# Patient Record
Sex: FEMALE | Race: WHITE | NOT HISPANIC OR LATINO
[De-identification: names, ages, dates, MRNs, and addresses within clinical notes are randomized per-mention and may not be internally consistent; named-entity substitution may affect disease eponyms.]

---

## 2018-12-13 ENCOUNTER — RECORD ABSTRACTING (OUTPATIENT)
Age: 63
End: 2018-12-13

## 2018-12-13 DIAGNOSIS — Z87.19 PERSONAL HISTORY OF OTHER DISEASES OF THE DIGESTIVE SYSTEM: ICD-10-CM

## 2018-12-13 DIAGNOSIS — Z82.61 FAMILY HISTORY OF ARTHRITIS: ICD-10-CM

## 2018-12-13 LAB — CYTOLOGY CVX/VAG DOC THIN PREP: NORMAL

## 2018-12-26 ENCOUNTER — APPOINTMENT (OUTPATIENT)
Age: 63
End: 2018-12-26

## 2018-12-26 ENCOUNTER — APPOINTMENT (OUTPATIENT)
Dept: FAMILY MEDICINE | Facility: CLINIC | Age: 63
End: 2018-12-26
Payer: MEDICAID

## 2018-12-26 PROCEDURE — 86580 TB INTRADERMAL TEST: CPT

## 2018-12-27 ENCOUNTER — RX RENEWAL (OUTPATIENT)
Age: 63
End: 2018-12-27

## 2018-12-28 ENCOUNTER — APPOINTMENT (OUTPATIENT)
Dept: FAMILY MEDICINE | Facility: CLINIC | Age: 63
End: 2018-12-28
Payer: MEDICAID

## 2018-12-28 PROCEDURE — G0009: CPT

## 2018-12-28 PROCEDURE — 86580 TB INTRADERMAL TEST: CPT

## 2018-12-28 PROCEDURE — 90732 PPSV23 VACC 2 YRS+ SUBQ/IM: CPT

## 2019-01-17 ENCOUNTER — APPOINTMENT (OUTPATIENT)
Dept: RHEUMATOLOGY | Facility: CLINIC | Age: 64
End: 2019-01-17
Payer: MEDICAID

## 2019-01-17 VITALS
RESPIRATION RATE: 16 BRPM | HEIGHT: 64.5 IN | DIASTOLIC BLOOD PRESSURE: 82 MMHG | SYSTOLIC BLOOD PRESSURE: 128 MMHG | HEART RATE: 93 BPM | OXYGEN SATURATION: 98 % | BODY MASS INDEX: 19.23 KG/M2 | WEIGHT: 114 LBS

## 2019-01-17 PROCEDURE — 99215 OFFICE O/P EST HI 40 MIN: CPT

## 2019-01-17 NOTE — HISTORY OF PRESENT ILLNESS
[FreeTextEntry1] : Continued on current regimen (i.e. Plaq and prednisone 10 mg daily) and still having some mild cervical spine symptoms.  Had negative PPD; and had a mild respiratory infection since last visit, but this has almost completely resolved at this time (mild residual chronic sinus symptoms).  Here for Enbrel teaching.

## 2019-01-17 NOTE — PHYSICAL EXAM
[General Appearance - Alert] : alert [General Appearance - In No Acute Distress] : in no acute distress [General Appearance - Well Nourished] : well nourished [General Appearance - Well Developed] : well developed [Sclera] : the sclera and conjunctiva were normal [Auscultation Breath Sounds / Voice Sounds] : lungs were clear to auscultation bilaterally [Cervical Lymph Nodes Enlarged Posterior Bilaterally] : posterior cervical [Cervical Lymph Nodes Enlarged Anterior Bilaterally] : anterior cervical [Musculoskeletal - Swelling] : no joint swelling seen [] : no rash [Motor Exam] : the motor exam was normal [FreeTextEntry1] : No peripheral synovitis; there is decreased ROM of C spine.

## 2019-01-23 ENCOUNTER — APPOINTMENT (OUTPATIENT)
Dept: INTERNAL MEDICINE | Facility: CLINIC | Age: 64
End: 2019-01-23
Payer: MEDICAID

## 2019-01-23 ENCOUNTER — TRANSCRIPTION ENCOUNTER (OUTPATIENT)
Age: 64
End: 2019-01-23

## 2019-01-23 VITALS — SYSTOLIC BLOOD PRESSURE: 140 MMHG | DIASTOLIC BLOOD PRESSURE: 88 MMHG

## 2019-01-23 VITALS
DIASTOLIC BLOOD PRESSURE: 90 MMHG | BODY MASS INDEX: 18.38 KG/M2 | HEART RATE: 92 BPM | OXYGEN SATURATION: 100 % | SYSTOLIC BLOOD PRESSURE: 160 MMHG | HEIGHT: 64.5 IN | TEMPERATURE: 98.1 F | WEIGHT: 109 LBS

## 2019-01-23 PROCEDURE — 36415 COLL VENOUS BLD VENIPUNCTURE: CPT

## 2019-01-23 PROCEDURE — 99213 OFFICE O/P EST LOW 20 MIN: CPT | Mod: 25

## 2019-01-23 RX ORDER — BENZONATATE 100 MG/1
100 CAPSULE ORAL 3 TIMES DAILY
Refills: 0 | Status: DISCONTINUED | COMMUNITY
End: 2019-01-23

## 2019-01-23 RX ORDER — ALBUTEROL SULFATE 90 UG/1
108 (90 BASE) AEROSOL, METERED RESPIRATORY (INHALATION) EVERY 6 HOURS
Refills: 0 | Status: DISCONTINUED | COMMUNITY
End: 2019-01-23

## 2019-01-23 NOTE — HISTORY OF PRESENT ILLNESS
[FreeTextEntry8] : c/o feeling tired and no appetite since last week, had first Enbrel shot last Thursday and by Fri started to feel tired and congested, also c  nausea and  unable to eat or drink ,no diarrhea

## 2019-01-23 NOTE — PHYSICAL EXAM
[No Acute Distress] : no acute distress [Normal Sclera/Conjunctiva] : normal sclera/conjunctiva [Normal Oropharynx] : the oropharynx was normal [Normal TMs] : both tympanic membranes were normal [Normal Nasal Mucosa] : the nasal mucosa was normal [Supple] : supple [No Lymphadenopathy] : no lymphadenopathy [No Respiratory Distress] : no respiratory distress  [Clear to Auscultation] : lungs were clear to auscultation bilaterally [Normal Rate] : normal rate  [Regular Rhythm] : with a regular rhythm [Normal S1, S2] : normal S1 and S2 [Soft] : abdomen soft [Non Tender] : non-tender [Normal Bowel Sounds] : normal bowel sounds [Normal Gait] : normal gait [No Focal Deficits] : no focal deficits [de-identified] : looking tired

## 2019-01-23 NOTE — REVIEW OF SYSTEMS
[Fatigue] : fatigue [Recent Change In Weight] : ~T recent weight change [Postnasal Drip] : postnasal drip [Nausea] : nausea [Heartburn] : heartburn [Joint Pain] : joint pain [Joint Stiffness] : joint stiffness [Negative] : Neurological [Fever] : no fever [Chills] : no chills [Earache] : no earache [Nasal Discharge] : no nasal discharge [Sore Throat] : no sore throat [Abdominal Pain] : no abdominal pain [Diarrhea] : diarrhea [Vomiting] : no vomiting [FreeTextEntry2] : lost 2-3 lb [FreeTextEntry9] : neck , better since treated

## 2019-01-24 LAB
ALBUMIN SERPL ELPH-MCNC: 4.7 G/DL
ALP BLD-CCNC: 41 U/L
ALT SERPL-CCNC: 15 U/L
ANION GAP SERPL CALC-SCNC: 14 MMOL/L
AST SERPL-CCNC: 17 U/L
BASOPHILS # BLD AUTO: 0.02 K/UL
BASOPHILS NFR BLD AUTO: 0.2 %
BILIRUB SERPL-MCNC: 0.4 MG/DL
BUN SERPL-MCNC: 9 MG/DL
CALCIUM SERPL-MCNC: 10 MG/DL
CHLORIDE SERPL-SCNC: 102 MMOL/L
CO2 SERPL-SCNC: 27 MMOL/L
CREAT SERPL-MCNC: 0.93 MG/DL
EOSINOPHIL # BLD AUTO: 0.01 K/UL
EOSINOPHIL NFR BLD AUTO: 0.1 %
GLUCOSE SERPL-MCNC: 111 MG/DL
HCT VFR BLD CALC: 49.4 %
HGB BLD-MCNC: 15.2 G/DL
IMM GRANULOCYTES NFR BLD AUTO: 0.1 %
LYMPHOCYTES # BLD AUTO: 0.82 K/UL
LYMPHOCYTES NFR BLD AUTO: 9.8 %
MAN DIFF?: NORMAL
MCHC RBC-ENTMCNC: 28.5 PG
MCHC RBC-ENTMCNC: 30.8 GM/DL
MCV RBC AUTO: 92.5 FL
MONOCYTES # BLD AUTO: 0.13 K/UL
MONOCYTES NFR BLD AUTO: 1.5 %
NEUTROPHILS # BLD AUTO: 7.4 K/UL
NEUTROPHILS NFR BLD AUTO: 88.3 %
PLATELET # BLD AUTO: 323 K/UL
POTASSIUM SERPL-SCNC: 4.6 MMOL/L
PROT SERPL-MCNC: 6.8 G/DL
RBC # BLD: 5.34 M/UL
RBC # FLD: 13.6 %
SODIUM SERPL-SCNC: 143 MMOL/L
WBC # FLD AUTO: 8.39 K/UL

## 2019-02-06 ENCOUNTER — RX RENEWAL (OUTPATIENT)
Age: 64
End: 2019-02-06

## 2019-03-01 ENCOUNTER — OTHER (OUTPATIENT)
Age: 64
End: 2019-03-01

## 2019-03-13 ENCOUNTER — APPOINTMENT (OUTPATIENT)
Dept: RHEUMATOLOGY | Facility: CLINIC | Age: 64
End: 2019-03-13
Payer: MEDICAID

## 2019-03-13 VITALS
BODY MASS INDEX: 19.39 KG/M2 | DIASTOLIC BLOOD PRESSURE: 90 MMHG | WEIGHT: 115 LBS | HEIGHT: 64.5 IN | SYSTOLIC BLOOD PRESSURE: 156 MMHG

## 2019-03-13 PROCEDURE — 99213 OFFICE O/P EST LOW 20 MIN: CPT

## 2019-03-13 RX ORDER — ETANERCEPT 50 MG/ML
50 SOLUTION SUBCUTANEOUS
Qty: 4 | Refills: 2 | Status: DISCONTINUED | COMMUNITY
Start: 1900-01-01 | End: 2019-03-13

## 2019-03-13 RX ORDER — PANTOPRAZOLE 40 MG/1
40 TABLET, DELAYED RELEASE ORAL
Qty: 30 | Refills: 0 | Status: DISCONTINUED | COMMUNITY
Start: 2019-01-23 | End: 2019-03-13

## 2019-03-13 NOTE — HISTORY OF PRESENT ILLNESS
[FreeTextEntry1] : Patient reports that despite having increased the prednisone back to 5 mg her neck symptoms have worsened, with severe neck pain and stiffness, and inability to turn her head. 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

## 2019-03-13 NOTE — PHYSICAL EXAM
[General Appearance - Alert] : alert [General Appearance - In No Acute Distress] : in no acute distress [General Appearance - Well Nourished] : well nourished [General Appearance - Well Developed] : well developed [Sclera] : the sclera and conjunctiva were normal [Cervical Lymph Nodes Enlarged Posterior Bilaterally] : posterior cervical [Musculoskeletal - Swelling] : no joint swelling seen [] : no rash [Motor Exam] : the motor exam was normal [FreeTextEntry1] : No peripheral synovitis; there is severely decreased ROM of C spine.

## 2019-03-13 NOTE — REVIEW OF SYSTEMS
[Feeling Tired] : feeling tired [Joint Pain] : joint pain [Joint Stiffness] : joint stiffness [Limb Pain] : limb pain [Fever] : no fever [Chills] : no chills [Feeling Poorly] : not feeling poorly [Eye Pain] : no eye pain [Red Eyes] : eyes not red [Shortness Of Breath] : no shortness of breath [Cough] : no cough [Skin Lesions] : no skin lesions [FreeTextEntry6] : No pleuritic C/P

## 2019-03-13 NOTE — REASON FOR VISIT
[Acute] : an acute visit [FreeTextEntry1] : Recurrence of neck pain and stiffness while tapering prednisone

## 2019-03-18 ENCOUNTER — RX RENEWAL (OUTPATIENT)
Age: 64
End: 2019-03-18

## 2019-03-20 ENCOUNTER — RX RENEWAL (OUTPATIENT)
Age: 64
End: 2019-03-20

## 2019-03-28 ENCOUNTER — LABORATORY RESULT (OUTPATIENT)
Age: 64
End: 2019-03-28

## 2019-03-28 ENCOUNTER — APPOINTMENT (OUTPATIENT)
Dept: INTERNAL MEDICINE | Facility: CLINIC | Age: 64
End: 2019-03-28
Payer: MEDICAID

## 2019-03-28 VITALS
HEART RATE: 64 BPM | WEIGHT: 112 LBS | BODY MASS INDEX: 18.89 KG/M2 | SYSTOLIC BLOOD PRESSURE: 118 MMHG | TEMPERATURE: 98.3 F | DIASTOLIC BLOOD PRESSURE: 72 MMHG | HEIGHT: 64.5 IN

## 2019-03-28 PROCEDURE — 36415 COLL VENOUS BLD VENIPUNCTURE: CPT

## 2019-03-28 PROCEDURE — 99396 PREV VISIT EST AGE 40-64: CPT | Mod: 25

## 2019-03-28 NOTE — REVIEW OF SYSTEMS
[Negative] : Heme/Lymph [Joint Stiffness] : joint stiffness [FreeTextEntry9] : neck pain c lateral motion

## 2019-03-28 NOTE — HISTORY OF PRESENT ILLNESS
[de-identified] : Chanell comes in today for annual exam. She's been diagnosed with seronegative rheumatoid arthritis presenting with neck pain. She took Enbrel for 7 weeks but did not work. At this moment she only takes hydroxychloroquine and higher doses of prednisone\par  she is waiting for Xeljanz approval.

## 2019-03-28 NOTE — HEALTH RISK ASSESSMENT
[Good] : ~his/her~  mood as  good [No falls in past year] : Patient reported no falls in the past year [0] : 2) Feeling down, depressed, or hopeless: Not at all (0) [Patient reported PAP Smear was normal] : Patient reported PAP Smear was normal [Patient reported colonoscopy was abnormal] : Patient reported colonoscopy was abnormal [HIV test declined] : HIV test declined [Hepatitis C test offered] : Hepatitis C test offered [Alone] : lives alone [Employed] : employed [Student] : student [College] : College [Single] : single [With Patient/Caregiver] : With Patient/Caregiver [Designated Healthcare Proxy] : Designated healthcare proxy [Name: ___] : Health Care Proxy's Name: [unfilled]  [Relationship: ___] : Relationship: [unfilled] [] : No [de-identified] : Foto, Wysoki [[id number]] : 0 [PapSmearDate] : 1/2018 [BoneDensityDate] : 1/2016 [BoneDensityComments] : osteoporosis [ColonoscopyDate] : 1/2015 [ColonoscopyComments] : polyp  due again 2020 [AdvancecareDate] : 3/28/19

## 2019-03-28 NOTE — PHYSICAL EXAM
[Normal] : normal gait, coordination grossly intact, no focal deficits [Thin] : thin [de-identified] : bilat dense , no lumps [de-identified] : neck mm spasms [de-identified] : no rashes, nail fungus

## 2019-03-28 NOTE — PAST MEDICAL HISTORY
[Postmenopausal] : postmenopausal [Menopause Age____] : age at menopause was [unfilled] [de-identified] : 51

## 2019-03-29 LAB
25(OH)D3 SERPL-MCNC: 24.7 NG/ML
ALBUMIN SERPL ELPH-MCNC: 4.8 G/DL
ALP BLD-CCNC: 37 U/L
ALT SERPL-CCNC: 12 U/L
ANION GAP SERPL CALC-SCNC: 15 MMOL/L
APPEARANCE: CLEAR
AST SERPL-CCNC: 18 U/L
BASOPHILS # BLD AUTO: 0.1 K/UL
BASOPHILS NFR BLD AUTO: 1.2 %
BILIRUB SERPL-MCNC: 0.4 MG/DL
BILIRUBIN URINE: NEGATIVE
BLOOD URINE: NEGATIVE
BUN SERPL-MCNC: 15 MG/DL
CALCIUM SERPL-MCNC: 9.8 MG/DL
CHLORIDE SERPL-SCNC: 103 MMOL/L
CHOLEST SERPL-MCNC: 221 MG/DL
CHOLEST/HDLC SERPL: 1.7 RATIO
CO2 SERPL-SCNC: 26 MMOL/L
COLOR: YELLOW
CREAT SERPL-MCNC: 1.05 MG/DL
EOSINOPHIL # BLD AUTO: 0.05 K/UL
EOSINOPHIL NFR BLD AUTO: 0.6 %
ESTIMATED AVERAGE GLUCOSE: 117 MG/DL
GLUCOSE QUALITATIVE U: NEGATIVE
GLUCOSE SERPL-MCNC: 82 MG/DL
HBA1C MFR BLD HPLC: 5.7 %
HCT VFR BLD CALC: 47.1 %
HDLC SERPL-MCNC: >120 MG/DL
HGB BLD-MCNC: 14 G/DL
IMM GRANULOCYTES NFR BLD AUTO: 0.2 %
KETONES URINE: NEGATIVE
LDLC SERPL CALC-MCNC: 73 MG/DL
LEUKOCYTE ESTERASE URINE: NEGATIVE
LYMPHOCYTES # BLD AUTO: 2.59 K/UL
LYMPHOCYTES NFR BLD AUTO: 30.8 %
MAN DIFF?: NORMAL
MCHC RBC-ENTMCNC: 29.5 PG
MCHC RBC-ENTMCNC: 29.7 GM/DL
MCV RBC AUTO: 99.4 FL
MONOCYTES # BLD AUTO: 0.69 K/UL
MONOCYTES NFR BLD AUTO: 8.2 %
NEUTROPHILS # BLD AUTO: 4.95 K/UL
NEUTROPHILS NFR BLD AUTO: 59 %
NITRITE URINE: NEGATIVE
PH URINE: 6
PLATELET # BLD AUTO: 268 K/UL
POTASSIUM SERPL-SCNC: 3.8 MMOL/L
PROT SERPL-MCNC: 6.8 G/DL
PROTEIN URINE: ABNORMAL
RBC # BLD: 4.74 M/UL
RBC # FLD: 14.6 %
SODIUM SERPL-SCNC: 144 MMOL/L
SPECIFIC GRAVITY URINE: 1.02
T4 FREE SERPL-MCNC: 1.6 NG/DL
TRIGL SERPL-MCNC: 81 MG/DL
TSH SERPL-ACNC: 1.72 UIU/ML
UROBILINOGEN URINE: NORMAL
VIT B12 SERPL-MCNC: 1301 PG/ML
WBC # FLD AUTO: 8.4 K/UL

## 2019-04-11 ENCOUNTER — APPOINTMENT (OUTPATIENT)
Dept: RHEUMATOLOGY | Facility: CLINIC | Age: 64
End: 2019-04-11
Payer: MEDICAID

## 2019-04-11 VITALS
OXYGEN SATURATION: 100 % | HEIGHT: 64.5 IN | DIASTOLIC BLOOD PRESSURE: 90 MMHG | WEIGHT: 112 LBS | SYSTOLIC BLOOD PRESSURE: 140 MMHG | HEART RATE: 82 BPM | BODY MASS INDEX: 18.89 KG/M2

## 2019-04-11 PROCEDURE — 99213 OFFICE O/P EST LOW 20 MIN: CPT

## 2019-04-11 NOTE — HISTORY OF PRESENT ILLNESS
[FreeTextEntry1] : Patient is still awaiting approval for change in biologic from insurance company. Appears frustrated about the time it is taking for insurance to approve change in biologic. Taking prednisone 15 mg daily, and having some difficulty in rotation of the cervical spine. No other progression of joint symptoms.

## 2019-04-11 NOTE — PHYSICAL EXAM
[General Appearance - Alert] : alert [General Appearance - Well Nourished] : well nourished [General Appearance - In No Acute Distress] : in no acute distress [Sclera] : the sclera and conjunctiva were normal [General Appearance - Well Developed] : well developed [Cervical Lymph Nodes Enlarged Posterior Bilaterally] : posterior cervical [Musculoskeletal - Swelling] : no joint swelling seen [Motor Exam] : the motor exam was normal [] : no rash [FreeTextEntry1] : No peripheral synovitis; there is severely decreased ROM of C spine.

## 2019-04-16 ENCOUNTER — OTHER (OUTPATIENT)
Age: 64
End: 2019-04-16

## 2019-05-01 ENCOUNTER — RX RENEWAL (OUTPATIENT)
Age: 64
End: 2019-05-01

## 2019-05-01 ENCOUNTER — TRANSCRIPTION ENCOUNTER (OUTPATIENT)
Age: 64
End: 2019-05-01

## 2019-05-09 ENCOUNTER — RESULT REVIEW (OUTPATIENT)
Age: 64
End: 2019-05-09

## 2019-05-10 ENCOUNTER — APPOINTMENT (OUTPATIENT)
Dept: RHEUMATOLOGY | Facility: CLINIC | Age: 64
End: 2019-05-10
Payer: MEDICAID

## 2019-05-10 VITALS
HEART RATE: 85 BPM | BODY MASS INDEX: 19.9 KG/M2 | WEIGHT: 118 LBS | HEIGHT: 64.5 IN | OXYGEN SATURATION: 100 % | SYSTOLIC BLOOD PRESSURE: 150 MMHG | DIASTOLIC BLOOD PRESSURE: 90 MMHG

## 2019-05-10 PROCEDURE — 99214 OFFICE O/P EST MOD 30 MIN: CPT

## 2019-05-11 NOTE — PHYSICAL EXAM
[General Appearance - Alert] : alert [General Appearance - Well Nourished] : well nourished [General Appearance - Well Developed] : well developed [General Appearance - In No Acute Distress] : in no acute distress [Cervical Lymph Nodes Enlarged Posterior Bilaterally] : posterior cervical [Sclera] : the sclera and conjunctiva were normal [Musculoskeletal - Swelling] : no joint swelling seen [] : no rash [Motor Exam] : the motor exam was normal [FreeTextEntry1] : No peripheral synovitis; there is severely decreased ROM of C spine.

## 2019-05-11 NOTE — HISTORY OF PRESENT ILLNESS
[FreeTextEntry1] : Patient reports she has not benefited from Humira. Still having neck symptoms which are unchanged on prednisone 10 mg daily.

## 2019-05-11 NOTE — REVIEW OF SYSTEMS
[Feeling Tired] : feeling tired [Joint Stiffness] : joint stiffness [Joint Pain] : joint pain [Limb Pain] : limb pain [Fever] : no fever [Chills] : no chills [Feeling Poorly] : not feeling poorly [Eye Pain] : no eye pain [Red Eyes] : eyes not red [Shortness Of Breath] : no shortness of breath [Skin Lesions] : no skin lesions [Cough] : no cough [FreeTextEntry6] : No pleuritic C/P

## 2019-05-13 ENCOUNTER — CLINICAL ADVICE (OUTPATIENT)
Age: 64
End: 2019-05-13

## 2019-05-14 ENCOUNTER — MESSAGE (OUTPATIENT)
Age: 64
End: 2019-05-14

## 2019-05-16 ENCOUNTER — OTHER (OUTPATIENT)
Age: 64
End: 2019-05-16

## 2019-05-31 ENCOUNTER — RX RENEWAL (OUTPATIENT)
Age: 64
End: 2019-05-31

## 2019-06-13 ENCOUNTER — RESULT REVIEW (OUTPATIENT)
Age: 64
End: 2019-06-13

## 2019-06-13 ENCOUNTER — APPOINTMENT (OUTPATIENT)
Dept: RHEUMATOLOGY | Facility: CLINIC | Age: 64
End: 2019-06-13
Payer: MEDICAID

## 2019-06-13 VITALS
WEIGHT: 116 LBS | HEART RATE: 90 BPM | HEIGHT: 64.5 IN | SYSTOLIC BLOOD PRESSURE: 140 MMHG | DIASTOLIC BLOOD PRESSURE: 80 MMHG | BODY MASS INDEX: 19.56 KG/M2 | OXYGEN SATURATION: 100 %

## 2019-06-13 PROCEDURE — 99214 OFFICE O/P EST MOD 30 MIN: CPT

## 2019-06-13 RX ORDER — COLCHICINE 0.6 MG/1
0.6 TABLET ORAL
Qty: 100 | Refills: 1 | Status: DISCONTINUED | OUTPATIENT
Start: 2019-05-16 | End: 2019-06-13

## 2019-06-13 RX ORDER — ADALIMUMAB 40MG/0.8ML
40 KIT SUBCUTANEOUS
Qty: 2 | Refills: 2 | Status: DISCONTINUED | COMMUNITY
Start: 2019-04-12 | End: 2019-06-13

## 2019-06-13 NOTE — REVIEW OF SYSTEMS
[Feeling Tired] : feeling tired [Joint Pain] : joint pain [Joint Stiffness] : joint stiffness [Limb Pain] : limb pain [Fever] : no fever [Chills] : no chills [Feeling Poorly] : not feeling poorly [Eye Pain] : no eye pain [Red Eyes] : eyes not red [Shortness Of Breath] : no shortness of breath [Cough] : no cough [FreeTextEntry6] : No pleuritic C/P [Skin Lesions] : no skin lesions

## 2019-06-13 NOTE — HISTORY OF PRESENT ILLNESS
[FreeTextEntry1] : Patient reports no benefit from colchicine. Still taking Humira and Plaquenil, and taking prednisone 15 mg daily with progression of joint symptoms - bilateral elbows are now swollen and painful with AM stiffness.

## 2019-06-13 NOTE — PHYSICAL EXAM
[General Appearance - Alert] : alert [General Appearance - In No Acute Distress] : in no acute distress [Sclera] : the sclera and conjunctiva were normal [General Appearance - Well Nourished] : well nourished [General Appearance - Well Developed] : well developed [Musculoskeletal - Swelling] : no joint swelling seen [] : no rash [Motor Exam] : the motor exam was normal [FreeTextEntry1] : There is severely decreased ROM of C spine; there is a large effusion, warmth and joint line tenderness of the left elbow, and bogginess, joint line tenderness of the right elbow.

## 2019-06-20 ENCOUNTER — RX RENEWAL (OUTPATIENT)
Age: 64
End: 2019-06-20

## 2019-06-26 ENCOUNTER — RX RENEWAL (OUTPATIENT)
Age: 64
End: 2019-06-26

## 2019-06-27 ENCOUNTER — APPOINTMENT (OUTPATIENT)
Dept: INTERNAL MEDICINE | Facility: CLINIC | Age: 64
End: 2019-06-27
Payer: MEDICAID

## 2019-06-27 VITALS
DIASTOLIC BLOOD PRESSURE: 80 MMHG | HEART RATE: 76 BPM | WEIGHT: 117 LBS | BODY MASS INDEX: 19.73 KG/M2 | TEMPERATURE: 98.9 F | SYSTOLIC BLOOD PRESSURE: 142 MMHG | HEIGHT: 64.5 IN

## 2019-06-27 VITALS — DIASTOLIC BLOOD PRESSURE: 80 MMHG | SYSTOLIC BLOOD PRESSURE: 140 MMHG

## 2019-06-27 PROCEDURE — 99213 OFFICE O/P EST LOW 20 MIN: CPT

## 2019-06-27 NOTE — HISTORY OF PRESENT ILLNESS
[de-identified] : c/o 2 months of intermittent R inguinal discomfort , seen by Gyn yesterday - full w/u negative

## 2019-06-27 NOTE — PHYSICAL EXAM
[No Acute Distress] : no acute distress [Well Developed] : well developed [Well-Appearing] : well-appearing [Normal Nasal Mucosa] : the nasal mucosa was normal [No Respiratory Distress] : no respiratory distress  [Clear to Auscultation] : lungs were clear to auscultation bilaterally [Normal Rate] : normal rate  [Regular Rhythm] : with a regular rhythm [No Edema] : there was no peripheral edema [Normal S1, S2] : normal S1 and S2 [Soft] : abdomen soft [Non Tender] : non-tender [No Hernias] : no hernias [Grossly Normal Strength/Tone] : grossly normal strength/tone [Normal Bowel Sounds] : normal bowel sounds [No Focal Deficits] : no focal deficits [Normal Gait] : normal gait [de-identified] : no lesion RLQ / inguinal area

## 2019-06-27 NOTE — REVIEW OF SYSTEMS
[Joint Stiffness] : joint stiffness [Muscle Pain] : muscle pain [Negative] : Neurological [FreeTextEntry9] : neck pain c lateral motion, R inguinal ??

## 2019-06-27 NOTE — HEALTH RISK ASSESSMENT
[Yes] : Yes [] : No [Monthly or less (1 pt)] : Monthly or less (1 point) [0] : 1) Little interest or pleasure doing things: Not at all (0) [[id number]] : 0

## 2019-07-01 ENCOUNTER — TRANSCRIPTION ENCOUNTER (OUTPATIENT)
Age: 64
End: 2019-07-01

## 2019-07-01 ENCOUNTER — APPOINTMENT (OUTPATIENT)
Dept: OBGYN | Facility: CLINIC | Age: 64
End: 2019-07-01

## 2019-07-18 ENCOUNTER — APPOINTMENT (OUTPATIENT)
Dept: RHEUMATOLOGY | Facility: CLINIC | Age: 64
End: 2019-07-18
Payer: MEDICAID

## 2019-07-18 VITALS
BODY MASS INDEX: 20.24 KG/M2 | DIASTOLIC BLOOD PRESSURE: 72 MMHG | SYSTOLIC BLOOD PRESSURE: 120 MMHG | WEIGHT: 120 LBS | HEIGHT: 64.5 IN

## 2019-07-18 PROCEDURE — 99214 OFFICE O/P EST MOD 30 MIN: CPT

## 2019-07-18 NOTE — HISTORY OF PRESENT ILLNESS
[FreeTextEntry1] : Patient is taking baricitinib for about 18 days and states neck movement has improved significantly.  Elbow pain and swelling has resolved after increasing prednisone to 20 mg daily.  Complaining of lower leg bruising.

## 2019-07-18 NOTE — PHYSICAL EXAM
[General Appearance - Alert] : alert [General Appearance - In No Acute Distress] : in no acute distress [General Appearance - Well Nourished] : well nourished [General Appearance - Well Developed] : well developed [Sclera] : the sclera and conjunctiva were normal [Musculoskeletal - Swelling] : no joint swelling seen [] : no rash [Motor Exam] : the motor exam was normal [FreeTextEntry1] : There is improved ROM of C spine. There is no peripheral synovitis.

## 2019-07-29 ENCOUNTER — RX RENEWAL (OUTPATIENT)
Age: 64
End: 2019-07-29

## 2019-08-02 ENCOUNTER — RX RENEWAL (OUTPATIENT)
Age: 64
End: 2019-08-02

## 2019-08-08 ENCOUNTER — OTHER (OUTPATIENT)
Age: 64
End: 2019-08-08

## 2019-08-13 ENCOUNTER — RX RENEWAL (OUTPATIENT)
Age: 64
End: 2019-08-13

## 2019-08-30 LAB — VECTRADNA DISEASE ACTIVITY TEST: NORMAL

## 2019-09-03 ENCOUNTER — RX RENEWAL (OUTPATIENT)
Age: 64
End: 2019-09-03

## 2019-09-05 ENCOUNTER — APPOINTMENT (OUTPATIENT)
Dept: INTERNAL MEDICINE | Facility: CLINIC | Age: 64
End: 2019-09-05
Payer: MEDICAID

## 2019-09-05 VITALS
DIASTOLIC BLOOD PRESSURE: 70 MMHG | BODY MASS INDEX: 20.4 KG/M2 | SYSTOLIC BLOOD PRESSURE: 122 MMHG | WEIGHT: 121 LBS | HEART RATE: 72 BPM | TEMPERATURE: 97.8 F | HEIGHT: 64.5 IN

## 2019-09-05 PROCEDURE — 99213 OFFICE O/P EST LOW 20 MIN: CPT

## 2019-09-05 RX ORDER — FLUOCINONIDE 0.5 MG/G
0.05 CREAM TOPICAL
Qty: 60 | Refills: 0 | Status: COMPLETED | COMMUNITY
Start: 2019-05-28

## 2019-09-05 NOTE — HISTORY OF PRESENT ILLNESS
[de-identified] : c/o 4 months of intermittent R inguinal discomfort , seen by Gyn yesterday - full w/u negative\par pain occurs during sex only , she has no pain otherwise, she is physically very active

## 2019-09-05 NOTE — PHYSICAL EXAM
[No Joint Swelling] : no joint swelling [Grossly Normal Strength/Tone] : grossly normal strength/tone [Normal] : normal gait, coordination grossly intact, no focal deficits and deep tendon reflexes were 2+ and symmetric [No Rash] : no rash [de-identified] : no lesions palpated or noted in R inguinal area

## 2019-09-05 NOTE — HEALTH RISK ASSESSMENT
[] : Yes [0-5] : 0-5 [No] : No [0] : 2) Feeling down, depressed, or hopeless: Not at all (0) [[id number]] : 0

## 2019-09-05 NOTE — REVIEW OF SYSTEMS
[Joint Stiffness] : no joint stiffness [Muscle Pain] : muscle pain [Muscle Weakness] : no muscle weakness [Negative] : Neurological [FreeTextEntry9] :  R inguinal ??

## 2019-09-12 ENCOUNTER — APPOINTMENT (OUTPATIENT)
Dept: RHEUMATOLOGY | Facility: CLINIC | Age: 64
End: 2019-09-12
Payer: MEDICAID

## 2019-09-12 VITALS
RESPIRATION RATE: 18 BRPM | HEIGHT: 64 IN | OXYGEN SATURATION: 100 % | SYSTOLIC BLOOD PRESSURE: 140 MMHG | DIASTOLIC BLOOD PRESSURE: 82 MMHG | HEART RATE: 77 BPM | WEIGHT: 120 LBS | BODY MASS INDEX: 20.49 KG/M2

## 2019-09-12 PROCEDURE — 99214 OFFICE O/P EST MOD 30 MIN: CPT

## 2019-09-12 NOTE — HISTORY OF PRESENT ILLNESS
[FreeTextEntry1] : Tapered prednisone per schedule and is currently on 7 mg daily. She noticed increasing neck symptoms at about a dose of 12.5 mg, and when she lowered to 10 mg her symptoms increased significantly. Currently having a lot of difficulty turning her head/neck.

## 2019-09-12 NOTE — PHYSICAL EXAM
[General Appearance - In No Acute Distress] : in no acute distress [General Appearance - Alert] : alert [General Appearance - Well Nourished] : well nourished [General Appearance - Well Developed] : well developed [Cervical Lymph Nodes Enlarged Posterior Bilaterally] : posterior cervical [Sclera] : the sclera and conjunctiva were normal [Musculoskeletal - Swelling] : no joint swelling seen [] : no rash [Motor Exam] : the motor exam was normal [FreeTextEntry1] : No peripheral synovitis; there is severely decreased ROM of C spine.

## 2019-09-12 NOTE — REVIEW OF SYSTEMS
[Feeling Tired] : feeling tired [Joint Stiffness] : joint stiffness [Joint Pain] : joint pain [Limb Pain] : limb pain [Chills] : no chills [Fever] : no fever [Feeling Poorly] : not feeling poorly [Red Eyes] : eyes not red [Eye Pain] : no eye pain [Cough] : no cough [Shortness Of Breath] : no shortness of breath [Skin Lesions] : no skin lesions [FreeTextEntry6] : No pleuritic C/P

## 2019-09-26 ENCOUNTER — RESULT REVIEW (OUTPATIENT)
Age: 64
End: 2019-09-26

## 2019-10-29 ENCOUNTER — RX RENEWAL (OUTPATIENT)
Age: 64
End: 2019-10-29

## 2019-11-14 ENCOUNTER — APPOINTMENT (OUTPATIENT)
Dept: RHEUMATOLOGY | Facility: CLINIC | Age: 64
End: 2019-11-14
Payer: MEDICAID

## 2019-11-14 PROCEDURE — 36415 COLL VENOUS BLD VENIPUNCTURE: CPT

## 2019-11-15 LAB
ALBUMIN SERPL ELPH-MCNC: 4.4 G/DL
ALP BLD-CCNC: 28 U/L
ALT SERPL-CCNC: 14 U/L
ANION GAP SERPL CALC-SCNC: 16 MMOL/L
AST SERPL-CCNC: 25 U/L
BASOPHILS # BLD AUTO: 0.07 K/UL
BASOPHILS NFR BLD AUTO: 0.9 %
BILIRUB SERPL-MCNC: 0.4 MG/DL
BUN SERPL-MCNC: 15 MG/DL
CALCIUM SERPL-MCNC: 9.8 MG/DL
CHLORIDE SERPL-SCNC: 101 MMOL/L
CO2 SERPL-SCNC: 26 MMOL/L
CREAT SERPL-MCNC: 1.12 MG/DL
EOSINOPHIL # BLD AUTO: 0.04 K/UL
EOSINOPHIL NFR BLD AUTO: 0.5 %
GLUCOSE SERPL-MCNC: 60 MG/DL
HCT VFR BLD CALC: 44.4 %
HGB BLD-MCNC: 13.8 G/DL
IMM GRANULOCYTES NFR BLD AUTO: 0.2 %
LYMPHOCYTES # BLD AUTO: 2.38 K/UL
LYMPHOCYTES NFR BLD AUTO: 29.1 %
MAN DIFF?: NORMAL
MCHC RBC-ENTMCNC: 30.1 PG
MCHC RBC-ENTMCNC: 31.1 GM/DL
MCV RBC AUTO: 96.7 FL
MONOCYTES # BLD AUTO: 0.81 K/UL
MONOCYTES NFR BLD AUTO: 9.9 %
NEUTROPHILS # BLD AUTO: 4.87 K/UL
NEUTROPHILS NFR BLD AUTO: 59.4 %
PLATELET # BLD AUTO: 286 K/UL
POTASSIUM SERPL-SCNC: 3.9 MMOL/L
PROT SERPL-MCNC: 6.4 G/DL
RBC # BLD: 4.59 M/UL
RBC # FLD: 12.7 %
SODIUM SERPL-SCNC: 143 MMOL/L
WBC # FLD AUTO: 8.19 K/UL

## 2019-11-22 ENCOUNTER — APPOINTMENT (OUTPATIENT)
Dept: INTERNAL MEDICINE | Facility: CLINIC | Age: 64
End: 2019-11-22
Payer: MEDICAID

## 2019-11-22 VITALS
WEIGHT: 120 LBS | TEMPERATURE: 98.7 F | HEIGHT: 64 IN | SYSTOLIC BLOOD PRESSURE: 164 MMHG | DIASTOLIC BLOOD PRESSURE: 90 MMHG | BODY MASS INDEX: 20.49 KG/M2 | HEART RATE: 76 BPM

## 2019-11-22 VITALS — DIASTOLIC BLOOD PRESSURE: 80 MMHG | SYSTOLIC BLOOD PRESSURE: 160 MMHG

## 2019-11-22 VITALS — DIASTOLIC BLOOD PRESSURE: 80 MMHG | SYSTOLIC BLOOD PRESSURE: 156 MMHG

## 2019-11-22 PROCEDURE — 99213 OFFICE O/P EST LOW 20 MIN: CPT

## 2019-11-22 NOTE — HISTORY OF PRESENT ILLNESS
[de-identified] : here for medical f/u after noticed high BP sys ~160 mmHg , she started Leflunomide 1 month ago and she thinks BP is high since then \par she is still on Pred  now 10 mg trying to taper

## 2019-11-22 NOTE — PHYSICAL EXAM
[No Edema] : there was no peripheral edema [Grossly Normal Strength/Tone] : grossly normal strength/tone [Normal] : normal gait, coordination grossly intact, no focal deficits and deep tendon reflexes were 2+ and symmetric [de-identified] : L shin superficial wound , no oozing , no erythema

## 2019-11-22 NOTE — REVIEW OF SYSTEMS
[Joint Stiffness] : no joint stiffness [Back Pain] : no back pain [Negative] : Neurological [FreeTextEntry9] : much improved [de-identified] : L shin laceration

## 2019-12-09 ENCOUNTER — RX RENEWAL (OUTPATIENT)
Age: 64
End: 2019-12-09

## 2019-12-12 ENCOUNTER — APPOINTMENT (OUTPATIENT)
Dept: RHEUMATOLOGY | Facility: CLINIC | Age: 64
End: 2019-12-12
Payer: MEDICAID

## 2019-12-12 VITALS — BODY MASS INDEX: 20.94 KG/M2 | SYSTOLIC BLOOD PRESSURE: 140 MMHG | DIASTOLIC BLOOD PRESSURE: 90 MMHG | WEIGHT: 122 LBS

## 2019-12-12 PROCEDURE — 99214 OFFICE O/P EST MOD 30 MIN: CPT

## 2019-12-12 NOTE — HISTORY OF PRESENT ILLNESS
[FreeTextEntry1] : Has tapered prednisone to 9 mg daily; still having some difficulty rotating C spine to the left. Left elbow synovitis has not recurred. Has been on leflunomide now for about 2 months.

## 2019-12-12 NOTE — PHYSICAL EXAM
[General Appearance - Alert] : alert [General Appearance - In No Acute Distress] : in no acute distress [General Appearance - Well Developed] : well developed [General Appearance - Well Nourished] : well nourished [Cervical Lymph Nodes Enlarged Posterior Bilaterally] : posterior cervical [Sclera] : the sclera and conjunctiva were normal [Musculoskeletal - Swelling] : no joint swelling seen [] : no rash [Motor Exam] : the motor exam was normal [FreeTextEntry1] : No peripheral synovitis; there is severely decreased ROM of C spine.

## 2019-12-12 NOTE — REVIEW OF SYSTEMS
[Feeling Tired] : feeling tired [Joint Pain] : joint pain [Limb Pain] : limb pain [Joint Stiffness] : joint stiffness [Fever] : no fever [Chills] : no chills [Feeling Poorly] : not feeling poorly [Eye Pain] : no eye pain [Red Eyes] : eyes not red [Shortness Of Breath] : no shortness of breath [Skin Lesions] : no skin lesions [FreeTextEntry6] : No pleuritic C/P [Cough] : no cough

## 2019-12-14 LAB
ALBUMIN SERPL ELPH-MCNC: 4.3 G/DL
ALP BLD-CCNC: 40 U/L
ALT SERPL-CCNC: 16 U/L
ANION GAP SERPL CALC-SCNC: 17 MMOL/L
AST SERPL-CCNC: 25 U/L
BASOPHILS # BLD AUTO: 0.06 K/UL
BASOPHILS NFR BLD AUTO: 1.1 %
BILIRUB SERPL-MCNC: 0.3 MG/DL
BUN SERPL-MCNC: 16 MG/DL
CALCIUM SERPL-MCNC: 10 MG/DL
CHLORIDE SERPL-SCNC: 102 MMOL/L
CO2 SERPL-SCNC: 25 MMOL/L
CREAT SERPL-MCNC: 1.08 MG/DL
EOSINOPHIL # BLD AUTO: 0.05 K/UL
EOSINOPHIL NFR BLD AUTO: 0.9 %
GLUCOSE SERPL-MCNC: 95 MG/DL
HCT VFR BLD CALC: 43.6 %
HGB BLD-MCNC: 13.6 G/DL
IMM GRANULOCYTES NFR BLD AUTO: 0.4 %
LYMPHOCYTES # BLD AUTO: 1.09 K/UL
LYMPHOCYTES NFR BLD AUTO: 19.2 %
MAN DIFF?: NORMAL
MCHC RBC-ENTMCNC: 30 PG
MCHC RBC-ENTMCNC: 31.2 GM/DL
MCV RBC AUTO: 96.2 FL
MONOCYTES # BLD AUTO: 0.42 K/UL
MONOCYTES NFR BLD AUTO: 7.4 %
NEUTROPHILS # BLD AUTO: 4.03 K/UL
NEUTROPHILS NFR BLD AUTO: 71 %
PLATELET # BLD AUTO: 340 K/UL
POTASSIUM SERPL-SCNC: 4.1 MMOL/L
PROT SERPL-MCNC: 6.6 G/DL
RBC # BLD: 4.53 M/UL
RBC # FLD: 12.5 %
SODIUM SERPL-SCNC: 144 MMOL/L
WBC # FLD AUTO: 5.67 K/UL

## 2019-12-18 ENCOUNTER — RX RENEWAL (OUTPATIENT)
Age: 64
End: 2019-12-18

## 2019-12-20 ENCOUNTER — APPOINTMENT (OUTPATIENT)
Dept: INTERNAL MEDICINE | Facility: CLINIC | Age: 64
End: 2019-12-20
Payer: MEDICAID

## 2019-12-20 VITALS — SYSTOLIC BLOOD PRESSURE: 150 MMHG | DIASTOLIC BLOOD PRESSURE: 80 MMHG

## 2019-12-20 VITALS
OXYGEN SATURATION: 98 % | HEIGHT: 64 IN | DIASTOLIC BLOOD PRESSURE: 80 MMHG | WEIGHT: 122 LBS | HEART RATE: 84 BPM | SYSTOLIC BLOOD PRESSURE: 170 MMHG | BODY MASS INDEX: 20.83 KG/M2

## 2019-12-20 PROCEDURE — 99213 OFFICE O/P EST LOW 20 MIN: CPT

## 2019-12-20 RX ORDER — PREDNISONE 1 MG/1
1 TABLET ORAL
Qty: 100 | Refills: 1 | Status: DISCONTINUED | COMMUNITY
Start: 2018-12-27 | End: 2019-12-20

## 2019-12-20 NOTE — HISTORY OF PRESENT ILLNESS
[de-identified] : here for BP f/u , under lots of stress at work, also on Pred 10 mg , has 1 epistaxis episode at home , BP at home ~145-150 mmHG

## 2019-12-20 NOTE — PHYSICAL EXAM
[No Edema] : there was no peripheral edema [No Joint Swelling] : no joint swelling [Grossly Normal Strength/Tone] : grossly normal strength/tone [Normal] : normal gait, coordination grossly intact, no focal deficits and deep tendon reflexes were 2+ and symmetric [de-identified] : L shin superficial wound , no oozing , no erythema

## 2019-12-20 NOTE — REVIEW OF SYSTEMS
[Joint Pain] : no joint pain [Joint Stiffness] : no joint stiffness [Joint Swelling] : no joint swelling [Muscle Pain] : no muscle pain [Back Pain] : no back pain [Negative] : Neurological [FreeTextEntry9] : much improved [de-identified] : L shin laceration

## 2019-12-26 ENCOUNTER — APPOINTMENT (OUTPATIENT)
Dept: FAMILY MEDICINE | Facility: CLINIC | Age: 64
End: 2019-12-26
Payer: MEDICAID

## 2019-12-26 VITALS
TEMPERATURE: 98 F | WEIGHT: 117 LBS | HEIGHT: 64 IN | DIASTOLIC BLOOD PRESSURE: 80 MMHG | SYSTOLIC BLOOD PRESSURE: 140 MMHG | BODY MASS INDEX: 19.97 KG/M2

## 2019-12-26 PROCEDURE — 99213 OFFICE O/P EST LOW 20 MIN: CPT

## 2019-12-26 RX ORDER — PREDNISONE 5 MG/1
5 TABLET ORAL
Qty: 60 | Refills: 0 | Status: DISCONTINUED | COMMUNITY
Start: 2019-06-26

## 2019-12-26 RX ORDER — FLUOROURACIL 20 MG/ML
2 SOLUTION TOPICAL
Qty: 10 | Refills: 0 | Status: DISCONTINUED | COMMUNITY
Start: 2019-09-19

## 2019-12-26 RX ORDER — TRIAMCINOLONE ACETONIDE 1 MG/G
0.1 CREAM TOPICAL
Qty: 15 | Refills: 0 | Status: DISCONTINUED | COMMUNITY
Start: 2019-09-26

## 2019-12-26 NOTE — HISTORY OF PRESENT ILLNESS
[FreeTextEntry8] : 65 yo female with a hsitor yof RA \par presents with abdominal cramping and clear liquid diarrhea \par started with cramping on Monday 12/22\par noted to have mild nausea but no constant vomiting\par worst episode with diarrhea occurred 12/25\par took imodium and pepto yesterday\par today improved \par solid stools and mild discomfort \par fears this is combination of lisinopril and RA meds \par afebrile without bloody  stool\par \par had mussels before severe symptoms developed on Christmas

## 2019-12-26 NOTE — PHYSICAL EXAM
[Soft] : abdomen soft [Normal] : normal rate, regular rhythm, normal S1 and S2 and no murmur heard [Normal Bowel Sounds] : normal bowel sounds [No HSM] : no HSM [Non Tender] : non-tender [de-identified] : distended however no RLQ tenderness neg boone,

## 2020-01-02 ENCOUNTER — APPOINTMENT (OUTPATIENT)
Dept: FAMILY MEDICINE | Facility: CLINIC | Age: 65
End: 2020-01-02
Payer: MEDICAID

## 2020-01-02 VITALS
HEIGHT: 64 IN | BODY MASS INDEX: 19.97 KG/M2 | WEIGHT: 117 LBS | SYSTOLIC BLOOD PRESSURE: 150 MMHG | DIASTOLIC BLOOD PRESSURE: 90 MMHG

## 2020-01-02 PROCEDURE — 99213 OFFICE O/P EST LOW 20 MIN: CPT

## 2020-01-02 RX ORDER — LOSARTAN POTASSIUM 25 MG/1
25 TABLET, FILM COATED ORAL
Qty: 90 | Refills: 0 | Status: DISCONTINUED | COMMUNITY
Start: 2019-11-22

## 2020-01-02 NOTE — PHYSICAL EXAM
[No Respiratory Distress] : no respiratory distress  [Normal Rate] : normal rate  [de-identified] : mild distention [Normal] : soft, non-tender, non-distended, no masses palpated, no HSM and normal bowel sounds

## 2020-01-02 NOTE — HISTORY OF PRESENT ILLNESS
[FreeTextEntry8] : 63 yo with history of RA on immunemodulators for follow up of diarrhea\par patient says Monday morning had coffee which brought on slight cramping and discomfort \par \par patient is unable to have a stool \par hasn’t had solid stool since after her previous bouts of diarrhea\par notes mild bloating and discomfort but no acute pain, vomiting\par she has not had previous issues with constipation \par does not mild fear of eating since her episode of diarrhea

## 2020-01-09 ENCOUNTER — APPOINTMENT (OUTPATIENT)
Dept: INTERNAL MEDICINE | Facility: CLINIC | Age: 65
End: 2020-01-09
Payer: MEDICAID

## 2020-01-09 VITALS — DIASTOLIC BLOOD PRESSURE: 80 MMHG | SYSTOLIC BLOOD PRESSURE: 140 MMHG

## 2020-01-09 VITALS
DIASTOLIC BLOOD PRESSURE: 76 MMHG | WEIGHT: 117 LBS | TEMPERATURE: 99.5 F | OXYGEN SATURATION: 98 % | HEIGHT: 64 IN | HEART RATE: 118 BPM | SYSTOLIC BLOOD PRESSURE: 160 MMHG | BODY MASS INDEX: 19.97 KG/M2 | RESPIRATION RATE: 16 BRPM

## 2020-01-09 PROCEDURE — 99213 OFFICE O/P EST LOW 20 MIN: CPT

## 2020-01-09 NOTE — REVIEW OF SYSTEMS
[Nausea] : nausea [Fatigue] : fatigue [Diarrhea] : diarrhea [Constipation] : constipation [Negative] : Genitourinary [Fever] : no fever [Chills] : no chills [Vomiting] : no vomiting [Heartburn] : no heartburn

## 2020-01-09 NOTE — PHYSICAL EXAM
[Non Tender] : non-tender [Soft] : abdomen soft [Normal] : normal gait, coordination grossly intact, no focal deficits and deep tendon reflexes were 2+ and symmetric [de-identified] : distended, incresed BS

## 2020-01-09 NOTE — HISTORY OF PRESENT ILLNESS
[de-identified] : diarrhea on X max,  for few days , responded to Imodium, but now she is constipated, seen by dr Gu 12/26 , 1/2\par slightly better today but still bloated

## 2020-01-16 ENCOUNTER — APPOINTMENT (OUTPATIENT)
Dept: INTERNAL MEDICINE | Facility: CLINIC | Age: 65
End: 2020-01-16
Payer: MEDICAID

## 2020-01-16 VITALS
SYSTOLIC BLOOD PRESSURE: 160 MMHG | HEIGHT: 64 IN | RESPIRATION RATE: 16 BRPM | TEMPERATURE: 97.9 F | WEIGHT: 117 LBS | OXYGEN SATURATION: 98 % | HEART RATE: 86 BPM | DIASTOLIC BLOOD PRESSURE: 80 MMHG | BODY MASS INDEX: 19.97 KG/M2

## 2020-01-16 VITALS — DIASTOLIC BLOOD PRESSURE: 66 MMHG | SYSTOLIC BLOOD PRESSURE: 136 MMHG

## 2020-01-16 PROCEDURE — 99213 OFFICE O/P EST LOW 20 MIN: CPT

## 2020-01-16 NOTE — PHYSICAL EXAM
[Soft] : abdomen soft [Non-distended] : non-distended [Non Tender] : non-tender [Normal] : normal gait, coordination grossly intact, no focal deficits and deep tendon reflexes were 2+ and symmetric

## 2020-01-16 NOTE — HISTORY OF PRESENT ILLNESS
[de-identified] : here for BP check , better GI transit  but not perfect \par fee;s bettr then last time ' BP at home ~135/70

## 2020-01-16 NOTE — REVIEW OF SYSTEMS
[Fever] : no fever [Chills] : no chills [Fatigue] : no fatigue [Nausea] : no nausea [Constipation] : constipation [Diarrhea] : diarrhea [Vomiting] : no vomiting [Heartburn] : no heartburn [Melena] : no melena [Negative] : Neurological

## 2020-01-21 ENCOUNTER — RX RENEWAL (OUTPATIENT)
Age: 65
End: 2020-01-21

## 2020-01-24 ENCOUNTER — APPOINTMENT (OUTPATIENT)
Dept: RHEUMATOLOGY | Facility: CLINIC | Age: 65
End: 2020-01-24
Payer: MEDICAID

## 2020-01-24 VITALS
HEIGHT: 64 IN | SYSTOLIC BLOOD PRESSURE: 160 MMHG | WEIGHT: 117 LBS | BODY MASS INDEX: 19.97 KG/M2 | DIASTOLIC BLOOD PRESSURE: 90 MMHG

## 2020-01-24 PROCEDURE — 99214 OFFICE O/P EST MOD 30 MIN: CPT

## 2020-01-27 LAB
ALBUMIN SERPL ELPH-MCNC: 4.4 G/DL
ALP BLD-CCNC: 40 U/L
ALT SERPL-CCNC: 15 U/L
ANION GAP SERPL CALC-SCNC: 15 MMOL/L
AST SERPL-CCNC: 24 U/L
BASOPHILS # BLD AUTO: 0.05 K/UL
BASOPHILS NFR BLD AUTO: 0.8 %
BILIRUB SERPL-MCNC: 0.4 MG/DL
BUN SERPL-MCNC: 10 MG/DL
CALCIUM SERPL-MCNC: 10 MG/DL
CHLORIDE SERPL-SCNC: 104 MMOL/L
CO2 SERPL-SCNC: 23 MMOL/L
CREAT SERPL-MCNC: 1 MG/DL
EOSINOPHIL # BLD AUTO: 0.37 K/UL
EOSINOPHIL NFR BLD AUTO: 5.6 %
ERYTHROCYTE [SEDIMENTATION RATE] IN BLOOD BY WESTERGREN METHOD: 14 MM/HR
FERRITIN SERPL-MCNC: 191 NG/ML
GLUCOSE SERPL-MCNC: 101 MG/DL
HCT VFR BLD CALC: 41.4 %
HGB BLD-MCNC: 13.1 G/DL
IMM GRANULOCYTES NFR BLD AUTO: 0.2 %
IRON SATN MFR SERPL: 19 %
IRON SERPL-MCNC: 56 UG/DL
LYMPHOCYTES # BLD AUTO: 0.56 K/UL
LYMPHOCYTES NFR BLD AUTO: 8.5 %
MAN DIFF?: NORMAL
MCHC RBC-ENTMCNC: 29.8 PG
MCHC RBC-ENTMCNC: 31.6 GM/DL
MCV RBC AUTO: 94.1 FL
MONOCYTES # BLD AUTO: 0.33 K/UL
MONOCYTES NFR BLD AUTO: 5 %
NEUTROPHILS # BLD AUTO: 5.3 K/UL
NEUTROPHILS NFR BLD AUTO: 79.9 %
PLATELET # BLD AUTO: 322 K/UL
POTASSIUM SERPL-SCNC: 4.9 MMOL/L
PROT SERPL-MCNC: 6.4 G/DL
RBC # BLD: 4.4 M/UL
RBC # FLD: 12.7 %
SODIUM SERPL-SCNC: 142 MMOL/L
TIBC SERPL-MCNC: 299 UG/DL
UIBC SERPL-MCNC: 242 UG/DL
VECTRADNA DISEASE ACTIVITY TEST: NORMAL
WBC # FLD AUTO: 6.62 K/UL

## 2020-01-27 NOTE — REVIEW OF SYSTEMS
[Feeling Tired] : feeling tired [Limb Pain] : limb pain [Joint Stiffness] : joint stiffness [Joint Pain] : joint pain [Fever] : no fever [Chills] : no chills [Feeling Poorly] : not feeling poorly [Eye Pain] : no eye pain [Red Eyes] : eyes not red [Shortness Of Breath] : no shortness of breath [Cough] : no cough [Skin Lesions] : no skin lesions [FreeTextEntry6] : No pleuritic C/P

## 2020-01-27 NOTE — PHYSICAL EXAM
[General Appearance - Alert] : alert [General Appearance - Well Developed] : well developed [General Appearance - In No Acute Distress] : in no acute distress [General Appearance - Well Nourished] : well nourished [Cervical Lymph Nodes Enlarged Posterior Bilaterally] : posterior cervical [Sclera] : the sclera and conjunctiva were normal [Musculoskeletal - Swelling] : no joint swelling seen [] : no rash [Motor Exam] : the motor exam was normal [FreeTextEntry1] : No peripheral synovitis; there is severely decreased ROM of C spine.

## 2020-01-27 NOTE — HISTORY OF PRESENT ILLNESS
[FreeTextEntry1] : Patient is still taking medications as Rx'ed. Having GI problems - intermittent diarrhea with cramping. RA symptoms have not improved much - still having some neck stiffness. Is also having some increased fatigue.

## 2020-02-18 ENCOUNTER — RX RENEWAL (OUTPATIENT)
Age: 65
End: 2020-02-18

## 2020-02-27 ENCOUNTER — APPOINTMENT (OUTPATIENT)
Dept: RHEUMATOLOGY | Facility: CLINIC | Age: 65
End: 2020-02-27
Payer: MEDICAID

## 2020-02-27 VITALS
SYSTOLIC BLOOD PRESSURE: 140 MMHG | HEIGHT: 64 IN | WEIGHT: 121 LBS | BODY MASS INDEX: 20.66 KG/M2 | DIASTOLIC BLOOD PRESSURE: 78 MMHG

## 2020-02-27 DIAGNOSIS — K52.9 NONINFECTIVE GASTROENTERITIS AND COLITIS, UNSPECIFIED: ICD-10-CM

## 2020-02-27 PROCEDURE — 99214 OFFICE O/P EST MOD 30 MIN: CPT

## 2020-02-27 NOTE — HISTORY OF PRESENT ILLNESS
[FreeTextEntry1] : Patient does not want to take lower doses of leflunomide. Clinical status has not yet changed since stopping leflunomide.

## 2020-02-27 NOTE — PHYSICAL EXAM
[General Appearance - In No Acute Distress] : in no acute distress [General Appearance - Alert] : alert [Sclera] : the sclera and conjunctiva were normal [General Appearance - Well Nourished] : well nourished [General Appearance - Well Developed] : well developed [Cervical Lymph Nodes Enlarged Posterior Bilaterally] : posterior cervical [Musculoskeletal - Swelling] : no joint swelling seen [] : no rash [Motor Exam] : the motor exam was normal [FreeTextEntry1] : No peripheral synovitis; there is severely decreased ROM of C spine.

## 2020-03-23 ENCOUNTER — RX RENEWAL (OUTPATIENT)
Age: 65
End: 2020-03-23

## 2020-03-24 ENCOUNTER — RX RENEWAL (OUTPATIENT)
Age: 65
End: 2020-03-24

## 2020-03-26 ENCOUNTER — APPOINTMENT (OUTPATIENT)
Dept: RHEUMATOLOGY | Facility: CLINIC | Age: 65
End: 2020-03-26

## 2020-04-02 ENCOUNTER — APPOINTMENT (OUTPATIENT)
Dept: INTERNAL MEDICINE | Facility: CLINIC | Age: 65
End: 2020-04-02
Payer: MEDICAID

## 2020-04-02 VITALS
OXYGEN SATURATION: 98 % | WEIGHT: 121 LBS | HEART RATE: 80 BPM | TEMPERATURE: 99.1 F | HEIGHT: 64 IN | BODY MASS INDEX: 20.66 KG/M2

## 2020-04-02 VITALS — SYSTOLIC BLOOD PRESSURE: 140 MMHG | DIASTOLIC BLOOD PRESSURE: 70 MMHG | TEMPERATURE: 98.1 F

## 2020-04-02 PROCEDURE — 99396 PREV VISIT EST AGE 40-64: CPT

## 2020-04-02 RX ORDER — LEFLUNOMIDE 20 MG/1
20 TABLET, FILM COATED ORAL DAILY
Qty: 90 | Refills: 1 | Status: DISCONTINUED | COMMUNITY
Start: 2019-11-22 | End: 2020-04-02

## 2020-04-02 NOTE — HISTORY OF PRESENT ILLNESS
[de-identified] : here for annual exam , RA stable , on new meds, feels fatigue, tapering steroids

## 2020-04-02 NOTE — REVIEW OF SYSTEMS
[Negative] : Psychiatric [Fever] : no fever [Chills] : no chills [Fatigue] : fatigue [Joint Pain] : joint pain [Muscle Pain] : muscle pain [Skin Rash] : skin rash [Easy Bleeding] : no easy bleeding [Easy Bruising] : easy bruising [FreeTextEntry9] : less then before [de-identified] : anna

## 2020-04-02 NOTE — PHYSICAL EXAM
[Thin] : thin [Normal] : no joint swelling, grossly normal strength/tone [de-identified] : bilat dense , no lumps [de-identified] : ant chest roula's dz, LE ecchymosis  and senile purpura lesions

## 2020-04-02 NOTE — HEALTH RISK ASSESSMENT
[Yes] : Yes [0] : 2) Feeling down, depressed, or hopeless: Not at all (0) [Patient reported mammogram was normal] : Patient reported mammogram was normal [Patient reported PAP Smear was normal] : Patient reported PAP Smear was normal [Patient reported bone density results were abnormal] : Patient reported bone density results were abnormal [Patient reported colonoscopy was normal] : Patient reported colonoscopy was normal [HIV test declined] : HIV test declined [Hepatitis C test offered] : Hepatitis C test offered [Alone] : lives alone [Employed] : employed [College] : College [Single] : single [Feels Safe at Home] : Feels safe at home [Patient/Caregiver not ready to engage] : Patient/Caregiver not ready to engage [Designated Healthcare Proxy] : Designated healthcare proxy [Good] : ~his/her~  mood as  good [] : Yes [0-5] : 0-5 [[id number]] : 0 [MammogramDate] : 06/19 [PapSmearDate] : 01/19 [BoneDensityDate] : 05/19 [ColonoscopyDate] : 03/18 [AdvancecareDate] : 04/02/20

## 2020-04-03 LAB
25(OH)D3 SERPL-MCNC: 24.7 NG/ML
ALBUMIN SERPL ELPH-MCNC: 4.6 G/DL
ALP BLD-CCNC: 36 U/L
ALT SERPL-CCNC: 12 U/L
ANION GAP SERPL CALC-SCNC: 17 MMOL/L
APPEARANCE: CLEAR
AST SERPL-CCNC: 27 U/L
BASOPHILS # BLD AUTO: 0.07 K/UL
BASOPHILS NFR BLD AUTO: 1 %
BILIRUB SERPL-MCNC: 0.3 MG/DL
BILIRUBIN URINE: NEGATIVE
BLOOD URINE: NEGATIVE
BUN SERPL-MCNC: 11 MG/DL
CALCIUM SERPL-MCNC: 9.6 MG/DL
CHLORIDE SERPL-SCNC: 104 MMOL/L
CHOLEST SERPL-MCNC: 246 MG/DL
CHOLEST/HDLC SERPL: 1.9 RATIO
CO2 SERPL-SCNC: 23 MMOL/L
COLOR: NORMAL
CREAT SERPL-MCNC: 1.12 MG/DL
EOSINOPHIL # BLD AUTO: 0.07 K/UL
EOSINOPHIL NFR BLD AUTO: 1 %
GLUCOSE QUALITATIVE U: NEGATIVE
GLUCOSE SERPL-MCNC: 85 MG/DL
HCT VFR BLD CALC: 45.8 %
HDLC SERPL-MCNC: 127 MG/DL
HGB BLD-MCNC: 13.9 G/DL
IMM GRANULOCYTES NFR BLD AUTO: 0.3 %
KETONES URINE: NEGATIVE
LDLC SERPL CALC-MCNC: 95 MG/DL
LEUKOCYTE ESTERASE URINE: NEGATIVE
LYMPHOCYTES # BLD AUTO: 1.48 K/UL
LYMPHOCYTES NFR BLD AUTO: 21.1 %
MAN DIFF?: NORMAL
MCHC RBC-ENTMCNC: 29.8 PG
MCHC RBC-ENTMCNC: 30.3 GM/DL
MCV RBC AUTO: 98.3 FL
MONOCYTES # BLD AUTO: 0.72 K/UL
MONOCYTES NFR BLD AUTO: 10.2 %
NEUTROPHILS # BLD AUTO: 4.67 K/UL
NEUTROPHILS NFR BLD AUTO: 66.4 %
NITRITE URINE: NEGATIVE
PH URINE: 6.5
PLATELET # BLD AUTO: 258 K/UL
POTASSIUM SERPL-SCNC: 4.2 MMOL/L
PROT SERPL-MCNC: 6.5 G/DL
PROTEIN URINE: NEGATIVE
RBC # BLD: 4.66 M/UL
RBC # FLD: 13.3 %
SODIUM SERPL-SCNC: 145 MMOL/L
SPECIFIC GRAVITY URINE: 1
T4 FREE SERPL-MCNC: 1.5 NG/DL
TRIGL SERPL-MCNC: 119 MG/DL
TSH SERPL-ACNC: 1.98 UIU/ML
UROBILINOGEN URINE: NORMAL
WBC # FLD AUTO: 7.03 K/UL

## 2020-04-30 ENCOUNTER — APPOINTMENT (OUTPATIENT)
Dept: RHEUMATOLOGY | Facility: CLINIC | Age: 65
End: 2020-04-30
Payer: MEDICAID

## 2020-04-30 VITALS
OXYGEN SATURATION: 98 % | SYSTOLIC BLOOD PRESSURE: 176 MMHG | BODY MASS INDEX: 20.66 KG/M2 | WEIGHT: 121 LBS | HEIGHT: 64 IN | HEART RATE: 102 BPM | DIASTOLIC BLOOD PRESSURE: 92 MMHG

## 2020-04-30 PROCEDURE — 99214 OFFICE O/P EST MOD 30 MIN: CPT

## 2020-05-01 NOTE — HISTORY OF PRESENT ILLNESS
[FreeTextEntry1] : Patient attempted to taper prednisone by 1 mg at a time - when reaching 7 mg daily joint symptoms increased.

## 2020-05-01 NOTE — PHYSICAL EXAM
[General Appearance - Alert] : alert [General Appearance - Well Developed] : well developed [General Appearance - Well Nourished] : well nourished [General Appearance - In No Acute Distress] : in no acute distress [Sclera] : the sclera and conjunctiva were normal [Musculoskeletal - Swelling] : no joint swelling seen [] : no rash [FreeTextEntry1] : No peripheral synovitis; there is decreased left lateral rotation of the C spine to about 30%.

## 2020-05-26 ENCOUNTER — RX RENEWAL (OUTPATIENT)
Age: 65
End: 2020-05-26

## 2020-06-02 ENCOUNTER — RX RENEWAL (OUTPATIENT)
Age: 65
End: 2020-06-02

## 2020-06-03 LAB — VECTRADNA DISEASE ACTIVITY TEST: NORMAL

## 2020-06-18 ENCOUNTER — APPOINTMENT (OUTPATIENT)
Dept: RHEUMATOLOGY | Facility: CLINIC | Age: 65
End: 2020-06-18
Payer: MEDICAID

## 2020-06-18 VITALS
WEIGHT: 122 LBS | DIASTOLIC BLOOD PRESSURE: 60 MMHG | BODY MASS INDEX: 20.83 KG/M2 | SYSTOLIC BLOOD PRESSURE: 142 MMHG | HEIGHT: 64 IN

## 2020-06-18 PROCEDURE — 99214 OFFICE O/P EST MOD 30 MIN: CPT

## 2020-06-18 NOTE — REVIEW OF SYSTEMS
[Feeling Tired] : feeling tired [Joint Pain] : joint pain [Joint Stiffness] : joint stiffness [Limb Pain] : limb pain [Fever] : no fever [Chills] : no chills [Eye Pain] : no eye pain [Feeling Poorly] : not feeling poorly [Red Eyes] : eyes not red [Cough] : no cough [Shortness Of Breath] : no shortness of breath [Skin Lesions] : no skin lesions [FreeTextEntry6] : No pleuritic C/P

## 2020-06-18 NOTE — PHYSICAL EXAM
[General Appearance - In No Acute Distress] : in no acute distress [General Appearance - Alert] : alert [General Appearance - Well Nourished] : well nourished [General Appearance - Well Developed] : well developed [Sclera] : the sclera and conjunctiva were normal [Musculoskeletal - Swelling] : no joint swelling seen [] : no rash [FreeTextEntry1] : No peripheral synovitis; there is much improved lateral rotation of the C spine.

## 2020-06-18 NOTE — PHYSICAL EXAM
[General Appearance - Alert] : alert [General Appearance - In No Acute Distress] : in no acute distress [General Appearance - Well Developed] : well developed [General Appearance - Well Nourished] : well nourished [Sclera] : the sclera and conjunctiva were normal [Musculoskeletal - Swelling] : no joint swelling seen [] : no rash [FreeTextEntry1] : No peripheral synovitis; there is much improved lateral rotation of the C spine.

## 2020-06-18 NOTE — HISTORY OF PRESENT ILLNESS
[FreeTextEntry1] : Feeling relatively well with occasional flares. Taking prednisone 8 mg daily and recent increase in prednisone was very effective per patient, and she reduced the dose back to 8 mg daily without incident. No infections since last visit.

## 2020-06-19 ENCOUNTER — RESULT REVIEW (OUTPATIENT)
Age: 65
End: 2020-06-19

## 2020-07-27 ENCOUNTER — RX RENEWAL (OUTPATIENT)
Age: 65
End: 2020-07-27

## 2020-08-13 ENCOUNTER — APPOINTMENT (OUTPATIENT)
Dept: RHEUMATOLOGY | Facility: CLINIC | Age: 65
End: 2020-08-13
Payer: MEDICAID

## 2020-08-13 VITALS
WEIGHT: 122 LBS | BODY MASS INDEX: 20.83 KG/M2 | HEIGHT: 64 IN | SYSTOLIC BLOOD PRESSURE: 152 MMHG | DIASTOLIC BLOOD PRESSURE: 80 MMHG

## 2020-08-13 PROCEDURE — 99214 OFFICE O/P EST MOD 30 MIN: CPT

## 2020-08-13 RX ORDER — CALCIUM CARBONATE/VITAMIN D3 600 MG-20
600-800 TABLET ORAL
Refills: 0 | Status: ACTIVE | COMMUNITY

## 2020-08-13 NOTE — HISTORY OF PRESENT ILLNESS
[FreeTextEntry1] : Patient has tapered prednisone to 7 mg daily without flare of RA symptoms. Taking medications as Rx'ed. Saw sarita recently.

## 2020-08-13 NOTE — PHYSICAL EXAM
[General Appearance - Well Nourished] : well nourished [General Appearance - Alert] : alert [General Appearance - In No Acute Distress] : in no acute distress [Sclera] : the sclera and conjunctiva were normal [General Appearance - Well Developed] : well developed [Musculoskeletal - Swelling] : no joint swelling seen [] : no rash [FreeTextEntry1] : No peripheral synovitis; there is much improved lateral rotation of the C spine.

## 2020-08-13 NOTE — REVIEW OF SYSTEMS
[Feeling Tired] : feeling tired [Joint Pain] : joint pain [Limb Pain] : limb pain [Joint Stiffness] : joint stiffness [Fever] : no fever [Chills] : no chills [Feeling Poorly] : not feeling poorly [Eye Pain] : no eye pain [Red Eyes] : eyes not red [Shortness Of Breath] : no shortness of breath [Cough] : no cough [Skin Lesions] : no skin lesions [FreeTextEntry6] : No pleuritic C/P

## 2020-08-25 ENCOUNTER — RX RENEWAL (OUTPATIENT)
Age: 65
End: 2020-08-25

## 2020-09-16 ENCOUNTER — RX RENEWAL (OUTPATIENT)
Age: 65
End: 2020-09-16

## 2020-10-22 ENCOUNTER — APPOINTMENT (OUTPATIENT)
Dept: RHEUMATOLOGY | Facility: CLINIC | Age: 65
End: 2020-10-22
Payer: MEDICAID

## 2020-10-22 ENCOUNTER — RESULT REVIEW (OUTPATIENT)
Age: 65
End: 2020-10-22

## 2020-10-22 VITALS
HEIGHT: 64 IN | BODY MASS INDEX: 20.83 KG/M2 | SYSTOLIC BLOOD PRESSURE: 140 MMHG | WEIGHT: 122 LBS | DIASTOLIC BLOOD PRESSURE: 82 MMHG

## 2020-10-22 PROCEDURE — 99072 ADDL SUPL MATRL&STAF TM PHE: CPT

## 2020-10-22 PROCEDURE — 99214 OFFICE O/P EST MOD 30 MIN: CPT | Mod: 25

## 2020-10-23 LAB
ALBUMIN SERPL ELPH-MCNC: 4.4 G/DL
ALP BLD-CCNC: 41 U/L
ALT SERPL-CCNC: 23 U/L
ANION GAP SERPL CALC-SCNC: 15 MMOL/L
AST SERPL-CCNC: 28 U/L
BASOPHILS # BLD AUTO: 0.06 K/UL
BASOPHILS NFR BLD AUTO: 0.6 %
BILIRUB SERPL-MCNC: 0.2 MG/DL
BUN SERPL-MCNC: 18 MG/DL
CALCIUM SERPL-MCNC: 10.3 MG/DL
CHLORIDE SERPL-SCNC: 102 MMOL/L
CO2 SERPL-SCNC: 26 MMOL/L
CREAT SERPL-MCNC: 0.97 MG/DL
EOSINOPHIL # BLD AUTO: 0.01 K/UL
EOSINOPHIL NFR BLD AUTO: 0.1 %
GLUCOSE SERPL-MCNC: 83 MG/DL
HCT VFR BLD CALC: 44 %
HGB BLD-MCNC: 13.4 G/DL
IMM GRANULOCYTES NFR BLD AUTO: 0.4 %
LYMPHOCYTES # BLD AUTO: 0.96 K/UL
LYMPHOCYTES NFR BLD AUTO: 9.4 %
MAN DIFF?: NORMAL
MCHC RBC-ENTMCNC: 29.9 PG
MCHC RBC-ENTMCNC: 30.5 GM/DL
MCV RBC AUTO: 98.2 FL
MONOCYTES # BLD AUTO: 0.6 K/UL
MONOCYTES NFR BLD AUTO: 5.9 %
NEUTROPHILS # BLD AUTO: 8.58 K/UL
NEUTROPHILS NFR BLD AUTO: 83.6 %
PLATELET # BLD AUTO: 327 K/UL
POTASSIUM SERPL-SCNC: 4.7 MMOL/L
PROT SERPL-MCNC: 6.5 G/DL
RBC # BLD: 4.48 M/UL
RBC # FLD: 12.9 %
SODIUM SERPL-SCNC: 144 MMOL/L
WBC # FLD AUTO: 10.25 K/UL

## 2020-10-24 NOTE — PHYSICAL EXAM
[General Appearance - Alert] : alert [General Appearance - In No Acute Distress] : in no acute distress [General Appearance - Well Nourished] : well nourished [General Appearance - Well Developed] : well developed [Sclera] : the sclera and conjunctiva were normal [Musculoskeletal - Swelling] : no joint swelling seen [] : no rash [FreeTextEntry1] : No peripheral synovitis; there is continued improvement in lateral rotation of the C spine.

## 2020-10-24 NOTE — HISTORY OF PRESENT ILLNESS
[FreeTextEntry1] : After her vacation she reduced her dose of prednisone to 6 mg daily without exacerbation of neck and other joint symptoms. Taking medications as Rx'ed.

## 2020-11-03 ENCOUNTER — NON-APPOINTMENT (OUTPATIENT)
Age: 65
End: 2020-11-03

## 2020-11-03 LAB — VECTRADNA DISEASE ACTIVITY TEST: NORMAL

## 2020-11-12 ENCOUNTER — APPOINTMENT (OUTPATIENT)
Dept: INTERNAL MEDICINE | Facility: CLINIC | Age: 65
End: 2020-11-12
Payer: MEDICAID

## 2020-11-12 VITALS
HEART RATE: 98 BPM | DIASTOLIC BLOOD PRESSURE: 70 MMHG | OXYGEN SATURATION: 99 % | TEMPERATURE: 98.8 F | HEIGHT: 64 IN | WEIGHT: 122.5 LBS | BODY MASS INDEX: 20.92 KG/M2 | SYSTOLIC BLOOD PRESSURE: 146 MMHG

## 2020-11-12 DIAGNOSIS — L11.1 TRANSIENT ACANTHOLYTIC DERMATOSIS [GROVER]: ICD-10-CM

## 2020-11-12 PROCEDURE — 36415 COLL VENOUS BLD VENIPUNCTURE: CPT

## 2020-11-12 PROCEDURE — 99072 ADDL SUPL MATRL&STAF TM PHE: CPT

## 2020-11-12 PROCEDURE — 99214 OFFICE O/P EST MOD 30 MIN: CPT | Mod: 25

## 2020-11-12 RX ORDER — CICLOPIROX 80 MG/ML
8 SOLUTION TOPICAL
Qty: 2 | Refills: 0 | Status: DISCONTINUED | COMMUNITY
Start: 2019-04-01 | End: 2020-11-12

## 2020-11-12 NOTE — HISTORY OF PRESENT ILLNESS
[de-identified] : here for medical evaluation, still tired on current RA meds, Vectra test up, neck flexibility better

## 2020-11-12 NOTE — REVIEW OF SYSTEMS
[Fatigue] : fatigue [Joint Pain] : joint pain [Fever] : no fever [Chills] : no chills [Night Sweats] : no night sweats [Recent Change In Weight] : ~T no recent weight change [Joint Stiffness] : joint stiffness [Muscle Pain] : no muscle pain [Skin Rash] : no skin rash [Easy Bleeding] : no easy bleeding [Easy Bruising] : no easy bruising [Negative] : Integumentary [FreeTextEntry9] : much less then before

## 2020-11-13 LAB
ESTIMATED AVERAGE GLUCOSE: 114 MG/DL
FERRITIN SERPL-MCNC: 81 NG/ML
HBA1C MFR BLD HPLC: 5.6 %
IRON SATN MFR SERPL: 24 %
IRON SERPL-MCNC: 65 UG/DL
T4 FREE SERPL-MCNC: 1.4 NG/DL
TIBC SERPL-MCNC: 268 UG/DL
TSH SERPL-ACNC: 1.17 UIU/ML
UIBC SERPL-MCNC: 203 UG/DL

## 2020-11-16 ENCOUNTER — TRANSCRIPTION ENCOUNTER (OUTPATIENT)
Age: 65
End: 2020-11-16

## 2020-11-17 ENCOUNTER — RX RENEWAL (OUTPATIENT)
Age: 65
End: 2020-11-17

## 2020-12-01 ENCOUNTER — RX RENEWAL (OUTPATIENT)
Age: 65
End: 2020-12-01

## 2020-12-15 ENCOUNTER — APPOINTMENT (OUTPATIENT)
Dept: RHEUMATOLOGY | Facility: CLINIC | Age: 65
End: 2020-12-15
Payer: MEDICAID

## 2020-12-15 VITALS
HEIGHT: 61 IN | WEIGHT: 122 LBS | TEMPERATURE: 98.1 F | DIASTOLIC BLOOD PRESSURE: 72 MMHG | SYSTOLIC BLOOD PRESSURE: 144 MMHG | BODY MASS INDEX: 23.03 KG/M2

## 2020-12-15 PROCEDURE — 99214 OFFICE O/P EST MOD 30 MIN: CPT | Mod: 25

## 2020-12-15 PROCEDURE — 20605 DRAIN/INJ JOINT/BURSA W/O US: CPT

## 2020-12-15 PROCEDURE — 99072 ADDL SUPL MATRL&STAF TM PHE: CPT

## 2020-12-15 NOTE — HISTORY OF PRESENT ILLNESS
[FreeTextEntry1] : Patient comes in sooner than scheduled because of several weeks of progressive joint pain and swelling. There is swelling, pain and increased stiffness in the left elbow and right wrist. Patient is taking medications as Rx'ed - still on Olumiant, Plaquenil, prednisone (6 mg daily) and SSZ 1 gm BID.

## 2020-12-15 NOTE — PHYSICAL EXAM
[General Appearance - Alert] : alert [General Appearance - In No Acute Distress] : in no acute distress [General Appearance - Well Nourished] : well nourished [General Appearance - Well Developed] : well developed [Sclera] : the sclera and conjunctiva were normal [Musculoskeletal - Swelling] : no joint swelling seen [] : no rash [FreeTextEntry1] : There is no worsening of C spine rotation. There is a large effusion, warmth and joint line tenderness of the left elbow. There is swelling of the extensor tendon sheath over the right wrist. No other synovitis is noted.

## 2020-12-15 NOTE — PROCEDURE
[FreeTextEntry1] : Left elbow prepped with betadine; 1% lido applied to subQ tissue; 2cc 1% lido and 2cc Kenalog 40 (80 mg) injected into left elbow with good results and no complications.

## 2021-01-04 ENCOUNTER — RX RENEWAL (OUTPATIENT)
Age: 66
End: 2021-01-04

## 2021-01-28 ENCOUNTER — APPOINTMENT (OUTPATIENT)
Dept: RHEUMATOLOGY | Facility: CLINIC | Age: 66
End: 2021-01-28
Payer: MEDICAID

## 2021-01-28 VITALS
HEIGHT: 61 IN | SYSTOLIC BLOOD PRESSURE: 150 MMHG | HEART RATE: 111 BPM | DIASTOLIC BLOOD PRESSURE: 82 MMHG | BODY MASS INDEX: 23.22 KG/M2 | WEIGHT: 123 LBS | OXYGEN SATURATION: 96 %

## 2021-01-28 PROCEDURE — 99072 ADDL SUPL MATRL&STAF TM PHE: CPT

## 2021-01-28 PROCEDURE — 99214 OFFICE O/P EST MOD 30 MIN: CPT

## 2021-01-28 RX ORDER — SULFASALAZINE 500 MG/1
500 TABLET, DELAYED RELEASE ORAL
Qty: 540 | Refills: 1 | Status: DISCONTINUED | COMMUNITY
Start: 2020-02-27 | End: 2021-01-28

## 2021-01-28 NOTE — HISTORY OF PRESENT ILLNESS
[FreeTextEntry1] : Patient is asking to reduce the dose of Plaquenil based on her ophthalmologist's comments on using no more than weight based dosing (although there is no retinal toxicity noted during the last note). She reports the left elbow improved after steroid injection at last visit, but still swollen and stiff. She is complaining of severe fatigue, and she is convinced it is due to the SSZ (after reading her medication education material) and wants to stop SSZ because of the fatigue.

## 2021-01-28 NOTE — PHYSICAL EXAM
[General Appearance - Alert] : alert [General Appearance - In No Acute Distress] : in no acute distress [General Appearance - Well Nourished] : well nourished [General Appearance - Well Developed] : well developed [Sclera] : the sclera and conjunctiva were normal [Musculoskeletal - Swelling] : no joint swelling seen [] : no rash [FreeTextEntry1] : There is no worsening of C spine rotation. There is a moderate effusion, warmth and joint line tenderness of the left elbow. There is also bogginess with a small effusion of the right elbow. There is swelling of the extensor tendon sheath over the right wrist. No other synovitis is noted. 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

## 2021-02-16 ENCOUNTER — NON-APPOINTMENT (OUTPATIENT)
Age: 66
End: 2021-02-16

## 2021-03-10 ENCOUNTER — RX RENEWAL (OUTPATIENT)
Age: 66
End: 2021-03-10

## 2021-03-25 ENCOUNTER — APPOINTMENT (OUTPATIENT)
Dept: RHEUMATOLOGY | Facility: CLINIC | Age: 66
End: 2021-03-25
Payer: MEDICAID

## 2021-03-25 VITALS
WEIGHT: 119 LBS | SYSTOLIC BLOOD PRESSURE: 150 MMHG | BODY MASS INDEX: 22.47 KG/M2 | HEIGHT: 61 IN | TEMPERATURE: 97.8 F | DIASTOLIC BLOOD PRESSURE: 92 MMHG

## 2021-03-25 PROCEDURE — 99072 ADDL SUPL MATRL&STAF TM PHE: CPT

## 2021-03-25 PROCEDURE — 99215 OFFICE O/P EST HI 40 MIN: CPT

## 2021-03-25 RX ORDER — BARICITINIB 2 MG/1
2 TABLET, FILM COATED ORAL
Qty: 30 | Refills: 2 | Status: DISCONTINUED | COMMUNITY
Start: 2019-06-14 | End: 2021-03-25

## 2021-03-25 NOTE — PHYSICAL EXAM
[General Appearance - Alert] : alert [General Appearance - In No Acute Distress] : in no acute distress [General Appearance - Well Nourished] : well nourished [General Appearance - Well Developed] : well developed [Sclera] : the sclera and conjunctiva were normal [Musculoskeletal - Swelling] : no joint swelling seen [] : no rash [Motor Exam] : the motor exam was normal [FreeTextEntry1] : There is no worsening of C spine rotation. There is a moderate effusion, warmth and joint line tenderness of the left elbow. There is also bogginess with a mild effusion of the right elbow. There is swelling of the extensor tendon sheath over the right wrist. There is tenderness of scattered MCP and PIP joints. 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

## 2021-03-25 NOTE — HISTORY OF PRESENT ILLNESS
[FreeTextEntry1] : Patient stopped SSZ and "brain fog" and severe fatigue resolved, but joint symptoms progressed with increased elbow and hand pain and stiffness. Neck symptoms have not recurred.

## 2021-03-26 LAB
ALBUMIN SERPL ELPH-MCNC: 4.5 G/DL
ALP BLD-CCNC: 51 U/L
ALT SERPL-CCNC: 18 U/L
ANION GAP SERPL CALC-SCNC: 18 MMOL/L
AST SERPL-CCNC: 24 U/L
BASOPHILS # BLD AUTO: 0.08 K/UL
BASOPHILS NFR BLD AUTO: 0.8 %
BILIRUB SERPL-MCNC: 0.4 MG/DL
BUN SERPL-MCNC: 14 MG/DL
CALCIUM SERPL-MCNC: 10.2 MG/DL
CHLORIDE SERPL-SCNC: 101 MMOL/L
CO2 SERPL-SCNC: 25 MMOL/L
CREAT SERPL-MCNC: 1.02 MG/DL
CRP SERPL-MCNC: 9 MG/L
EOSINOPHIL # BLD AUTO: 0.03 K/UL
EOSINOPHIL NFR BLD AUTO: 0.3 %
ERYTHROCYTE [SEDIMENTATION RATE] IN BLOOD BY WESTERGREN METHOD: 6 MM/HR
GLUCOSE SERPL-MCNC: 75 MG/DL
HCT VFR BLD CALC: 44.6 %
HGB BLD-MCNC: 14 G/DL
IMM GRANULOCYTES NFR BLD AUTO: 0.3 %
LYMPHOCYTES # BLD AUTO: 1.07 K/UL
LYMPHOCYTES NFR BLD AUTO: 11 %
MAN DIFF?: NORMAL
MCHC RBC-ENTMCNC: 30.4 PG
MCHC RBC-ENTMCNC: 31.4 GM/DL
MCV RBC AUTO: 97 FL
MONOCYTES # BLD AUTO: 0.57 K/UL
MONOCYTES NFR BLD AUTO: 5.9 %
NEUTROPHILS # BLD AUTO: 7.94 K/UL
NEUTROPHILS NFR BLD AUTO: 81.7 %
PLATELET # BLD AUTO: 340 K/UL
POTASSIUM SERPL-SCNC: 4.2 MMOL/L
PROT SERPL-MCNC: 6.6 G/DL
RBC # BLD: 4.6 M/UL
RBC # FLD: 13.2 %
RHEUMATOID FACT SER QL: <10 IU/ML
SODIUM SERPL-SCNC: 144 MMOL/L
WBC # FLD AUTO: 9.72 K/UL

## 2021-03-27 LAB — ANA SER IF-ACNC: NEGATIVE

## 2021-04-08 ENCOUNTER — RESULT REVIEW (OUTPATIENT)
Age: 66
End: 2021-04-08

## 2021-04-14 LAB — VECTRADNA DISEASE ACTIVITY TEST: NORMAL

## 2021-04-19 ENCOUNTER — TRANSCRIPTION ENCOUNTER (OUTPATIENT)
Age: 66
End: 2021-04-19

## 2021-05-12 ENCOUNTER — APPOINTMENT (OUTPATIENT)
Dept: RHEUMATOLOGY | Facility: CLINIC | Age: 66
End: 2021-05-12
Payer: MEDICAID

## 2021-05-12 VITALS
WEIGHT: 120 LBS | HEIGHT: 61 IN | SYSTOLIC BLOOD PRESSURE: 162 MMHG | DIASTOLIC BLOOD PRESSURE: 88 MMHG | BODY MASS INDEX: 22.66 KG/M2 | TEMPERATURE: 98.1 F

## 2021-05-12 PROCEDURE — 99214 OFFICE O/P EST MOD 30 MIN: CPT

## 2021-05-12 PROCEDURE — 99072 ADDL SUPL MATRL&STAF TM PHE: CPT

## 2021-05-12 NOTE — PHYSICAL EXAM
[General Appearance - Alert] : alert [General Appearance - In No Acute Distress] : in no acute distress [General Appearance - Well Nourished] : well nourished [General Appearance - Well Developed] : well developed [Sclera] : the sclera and conjunctiva were normal [Musculoskeletal - Swelling] : no joint swelling seen [] : no rash [Motor Exam] : the motor exam was normal [FreeTextEntry1] : There is no worsening of C spine rotation. There is a moderate effusion, warmth and joint line tenderness of the left elbow (improved from last visit, with increased active ROM). There is no synovitis of the right elbow. There is swelling of the extensor tendon sheath over the right wrist. There is no longer tenderness of MCP and PIP joints. 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 There is a positive Fin[place]ien test of the left elbow.

## 2021-05-12 NOTE — HISTORY OF PRESENT ILLNESS
[FreeTextEntry1] : Patient is reporting significant improvement in joint symptoms since starting Actemra - has only had 4 injections. Still on prednisone 6 mg daily. Still having some left elbow pain (points to lateral epicondyle).

## 2021-05-13 ENCOUNTER — APPOINTMENT (OUTPATIENT)
Dept: INTERNAL MEDICINE | Facility: CLINIC | Age: 66
End: 2021-05-13
Payer: MEDICAID

## 2021-05-13 ENCOUNTER — LABORATORY RESULT (OUTPATIENT)
Age: 66
End: 2021-05-13

## 2021-05-13 VITALS
BODY MASS INDEX: 22.84 KG/M2 | OXYGEN SATURATION: 99 % | TEMPERATURE: 97.3 F | WEIGHT: 121 LBS | DIASTOLIC BLOOD PRESSURE: 86 MMHG | HEIGHT: 61 IN | SYSTOLIC BLOOD PRESSURE: 148 MMHG | HEART RATE: 78 BPM

## 2021-05-13 VITALS — SYSTOLIC BLOOD PRESSURE: 140 MMHG | DIASTOLIC BLOOD PRESSURE: 80 MMHG

## 2021-05-13 PROCEDURE — 99072 ADDL SUPL MATRL&STAF TM PHE: CPT

## 2021-05-13 PROCEDURE — 36415 COLL VENOUS BLD VENIPUNCTURE: CPT

## 2021-05-13 PROCEDURE — 99396 PREV VISIT EST AGE 40-64: CPT | Mod: 25

## 2021-05-13 NOTE — REVIEW OF SYSTEMS
[Fatigue] : fatigue [Joint Pain] : joint pain [Joint Stiffness] : joint stiffness [Fever] : no fever [Chills] : no chills [Night Sweats] : no night sweats [Recent Change In Weight] : ~T no recent weight change [Muscle Pain] : no muscle pain [Skin Rash] : no skin rash [Easy Bleeding] : no easy bleeding [Easy Bruising] : no easy bruising [Negative] : Heme/Lymph [FreeTextEntry3] : 6 56 monhts check [FreeTextEntry9] : much less then before, neck ans elbows better

## 2021-05-13 NOTE — HISTORY OF PRESENT ILLNESS
[de-identified] : here for annual exam, doing better on current rx for RA, inconsistent c Fosamax , getting dental implants, will hold off \par still fatigued , but minimal side effects from Actemra

## 2021-05-13 NOTE — HEALTH RISK ASSESSMENT
[Good] : ~his/her~  mood as  good [] : Yes [0-5] : 0-5 [Yes] : Yes [Patient reported mammogram was normal] : Patient reported mammogram was normal [Patient reported colonoscopy was normal] : Patient reported colonoscopy was normal [HIV test declined] : HIV test declined [Hepatitis C test offered] : Hepatitis C test offered [Alone] : lives alone [Employed] : employed [College] : College [Single] : single [# Of Children ___] : has [unfilled] children [Patient/Caregiver not ready to engage] : Patient/Caregiver not ready to engage [MammogramDate] : 01/19 [ColonoscopyDate] : 01/18 [BoneDensityDate] : 01/20 [AdvancecareDate] : 05/13/21

## 2021-05-13 NOTE — PHYSICAL EXAM
[Thin] : thin [Normal] : normal gait, coordination grossly intact, no focal deficits [de-identified] : bilat dense , no lumps [de-identified] : minimal swelling L elbow [de-identified] : ant chest roula's dz, LE ecchymosis  and senile purpura lesions

## 2021-05-14 LAB
25(OH)D3 SERPL-MCNC: 48.1 NG/ML
ALBUMIN SERPL ELPH-MCNC: 4.6 G/DL
ALP BLD-CCNC: 40 U/L
ALT SERPL-CCNC: 27 U/L
ANION GAP SERPL CALC-SCNC: 12 MMOL/L
APPEARANCE: CLEAR
AST SERPL-CCNC: 27 U/L
BASOPHILS # BLD AUTO: 0.1 K/UL
BASOPHILS NFR BLD AUTO: 1.2 %
BILIRUB SERPL-MCNC: 0.5 MG/DL
BILIRUBIN URINE: NEGATIVE
BLOOD URINE: NEGATIVE
BUN SERPL-MCNC: 16 MG/DL
CALCIUM SERPL-MCNC: 9.8 MG/DL
CHLORIDE SERPL-SCNC: 105 MMOL/L
CHOLEST SERPL-MCNC: 233 MG/DL
CO2 SERPL-SCNC: 26 MMOL/L
COLOR: YELLOW
CREAT SERPL-MCNC: 1.1 MG/DL
EOSINOPHIL # BLD AUTO: 0.08 K/UL
EOSINOPHIL NFR BLD AUTO: 1 %
ESTIMATED AVERAGE GLUCOSE: 114 MG/DL
GLUCOSE QUALITATIVE U: NEGATIVE
GLUCOSE SERPL-MCNC: 94 MG/DL
HBA1C MFR BLD HPLC: 5.6 %
HCT VFR BLD CALC: 47 %
HDLC SERPL-MCNC: 120 MG/DL
HGB BLD-MCNC: 14.4 G/DL
IMM GRANULOCYTES NFR BLD AUTO: 0.2 %
KETONES URINE: NEGATIVE
LDLC SERPL CALC-MCNC: 90 MG/DL
LEUKOCYTE ESTERASE URINE: NEGATIVE
LYMPHOCYTES # BLD AUTO: 1.66 K/UL
LYMPHOCYTES NFR BLD AUTO: 20.7 %
MAN DIFF?: NORMAL
MCHC RBC-ENTMCNC: 29.7 PG
MCHC RBC-ENTMCNC: 30.6 GM/DL
MCV RBC AUTO: 96.9 FL
MONOCYTES # BLD AUTO: 0.87 K/UL
MONOCYTES NFR BLD AUTO: 10.8 %
NEUTROPHILS # BLD AUTO: 5.3 K/UL
NEUTROPHILS NFR BLD AUTO: 66.1 %
NITRITE URINE: NEGATIVE
NONHDLC SERPL-MCNC: 113 MG/DL
PH URINE: 5.5
PLATELET # BLD AUTO: 246 K/UL
POTASSIUM SERPL-SCNC: 4 MMOL/L
PROT SERPL-MCNC: 6.6 G/DL
PROTEIN URINE: ABNORMAL
RBC # BLD: 4.85 M/UL
RBC # FLD: 13.2 %
SODIUM SERPL-SCNC: 143 MMOL/L
SPECIFIC GRAVITY URINE: 1.02
T4 FREE SERPL-MCNC: 1.3 NG/DL
TRIGL SERPL-MCNC: 116 MG/DL
TSH SERPL-ACNC: 1.39 UIU/ML
UROBILINOGEN URINE: NORMAL
WBC # FLD AUTO: 8.03 K/UL

## 2021-06-18 ENCOUNTER — APPOINTMENT (OUTPATIENT)
Dept: INTERNAL MEDICINE | Facility: CLINIC | Age: 66
End: 2021-06-18
Payer: MEDICAID

## 2021-06-18 VITALS
SYSTOLIC BLOOD PRESSURE: 136 MMHG | OXYGEN SATURATION: 99 % | HEART RATE: 75 BPM | BODY MASS INDEX: 23.22 KG/M2 | HEIGHT: 61 IN | DIASTOLIC BLOOD PRESSURE: 74 MMHG | WEIGHT: 123 LBS | TEMPERATURE: 98.3 F

## 2021-06-18 VITALS — DIASTOLIC BLOOD PRESSURE: 64 MMHG | SYSTOLIC BLOOD PRESSURE: 138 MMHG

## 2021-06-18 PROCEDURE — 99213 OFFICE O/P EST LOW 20 MIN: CPT

## 2021-06-18 PROCEDURE — 99072 ADDL SUPL MATRL&STAF TM PHE: CPT

## 2021-06-18 NOTE — HISTORY OF PRESENT ILLNESS
[de-identified] : here for BP check and comparing the home bP device \par doing well on tapering Prednisone

## 2021-06-18 NOTE — REVIEW OF SYSTEMS
[Fever] : no fever [Chills] : no chills [Fatigue] : no fatigue [Night Sweats] : no night sweats [Recent Change In Weight] : ~T no recent weight change [Joint Pain] : joint pain [Joint Stiffness] : joint stiffness [Muscle Pain] : no muscle pain [Skin Rash] : no skin rash [Easy Bleeding] : no easy bleeding [Easy Bruising] : no easy bruising [Negative] : Neurological [FreeTextEntry9] : much less then before, neck and elbows better

## 2021-06-18 NOTE — PHYSICAL EXAM
[No Varicosities] : no varicosities [No Edema] : there was no peripheral edema [No Joint Swelling] : no joint swelling [Grossly Normal Strength/Tone] : grossly normal strength/tone [Normal] : normal gait, coordination grossly intact, no focal deficits and deep tendon reflexes were 2+ and symmetric

## 2021-06-21 ENCOUNTER — TRANSCRIPTION ENCOUNTER (OUTPATIENT)
Age: 66
End: 2021-06-21

## 2021-06-30 ENCOUNTER — TRANSCRIPTION ENCOUNTER (OUTPATIENT)
Age: 66
End: 2021-06-30

## 2021-07-15 ENCOUNTER — RESULT REVIEW (OUTPATIENT)
Age: 66
End: 2021-07-15

## 2021-07-15 ENCOUNTER — APPOINTMENT (OUTPATIENT)
Dept: RHEUMATOLOGY | Facility: CLINIC | Age: 66
End: 2021-07-15
Payer: MEDICAID

## 2021-07-15 VITALS
BODY MASS INDEX: 23.03 KG/M2 | SYSTOLIC BLOOD PRESSURE: 130 MMHG | DIASTOLIC BLOOD PRESSURE: 78 MMHG | TEMPERATURE: 98.1 F | WEIGHT: 122 LBS | HEIGHT: 61 IN

## 2021-07-15 PROCEDURE — 99072 ADDL SUPL MATRL&STAF TM PHE: CPT

## 2021-07-15 PROCEDURE — 99214 OFFICE O/P EST MOD 30 MIN: CPT | Mod: 25

## 2021-07-15 PROCEDURE — 20605 DRAIN/INJ JOINT/BURSA W/O US: CPT

## 2021-07-15 NOTE — PHYSICAL EXAM
[General Appearance - Alert] : alert [General Appearance - In No Acute Distress] : in no acute distress [General Appearance - Well Nourished] : well nourished [General Appearance - Well Developed] : well developed [Sclera] : the sclera and conjunctiva were normal [Musculoskeletal - Swelling] : no joint swelling seen [] : no rash [Motor Exam] : the motor exam was normal [FreeTextEntry1] : There is no worsening of C spine rotation. There is a moderate effusion, warmth and joint line tenderness of the left elbow (improved from last visit, with increased active ROM). There is no synovitis of the right elbow. There is swelling of the extensor tendon sheath over the right wrist. There is no longer tenderness of MCP and PIP joints. 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

## 2021-07-15 NOTE — HISTORY OF PRESENT ILLNESS
[FreeTextEntry1] : Feeling much better with reduced pain and stiffness. Reduced prednisone to 5 mg daily but felt she could not taper further. No infection since last visit. Has been holding Fosamax because of recent dental implants - has BMD study done today.

## 2021-08-10 ENCOUNTER — RX RENEWAL (OUTPATIENT)
Age: 66
End: 2021-08-10

## 2021-08-22 ENCOUNTER — RX RENEWAL (OUTPATIENT)
Age: 66
End: 2021-08-22

## 2021-08-24 ENCOUNTER — TRANSCRIPTION ENCOUNTER (OUTPATIENT)
Age: 66
End: 2021-08-24

## 2021-09-07 ENCOUNTER — RX RENEWAL (OUTPATIENT)
Age: 66
End: 2021-09-07

## 2021-10-14 ENCOUNTER — APPOINTMENT (OUTPATIENT)
Dept: RHEUMATOLOGY | Facility: CLINIC | Age: 66
End: 2021-10-14
Payer: MEDICARE

## 2021-10-14 PROCEDURE — 99214 OFFICE O/P EST MOD 30 MIN: CPT

## 2021-10-14 RX ORDER — PREDNISONE 5 MG/1
5 TABLET ORAL
Qty: 90 | Refills: 1 | Status: DISCONTINUED | COMMUNITY
Start: 2020-05-01 | End: 2021-10-14

## 2021-10-14 NOTE — HISTORY OF PRESENT ILLNESS
[FreeTextEntry1] : Patient reports she reduced her dose of prednisone to 3 mg daily. Neck symptoms have improved. Has been having daily HAs, but one single Advil seems to benefit. (She believes the HAs are due to Actemra.)

## 2021-10-14 NOTE — PHYSICAL EXAM
[General Appearance - Alert] : alert [General Appearance - In No Acute Distress] : in no acute distress [General Appearance - Well Nourished] : well nourished [General Appearance - Well Developed] : well developed [Sclera] : the sclera and conjunctiva were normal [Musculoskeletal - Swelling] : no joint swelling seen [] : no rash [Motor Exam] : the motor exam was normal [FreeTextEntry1] : There is improved C spine rotation. There is no active synovitis, including in the elbows.There is swelling of the extensor tendon sheath over the right wrist. There is no longer tenderness of MCP and PIP joints. 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

## 2021-10-22 ENCOUNTER — APPOINTMENT (OUTPATIENT)
Dept: ENDOCRINOLOGY | Facility: CLINIC | Age: 66
End: 2021-10-22
Payer: MEDICARE

## 2021-10-22 VITALS
OXYGEN SATURATION: 98 % | BODY MASS INDEX: 22.66 KG/M2 | DIASTOLIC BLOOD PRESSURE: 80 MMHG | SYSTOLIC BLOOD PRESSURE: 140 MMHG | WEIGHT: 120 LBS | HEIGHT: 61 IN | HEART RATE: 81 BPM

## 2021-10-22 PROCEDURE — 99203 OFFICE O/P NEW LOW 30 MIN: CPT

## 2021-10-23 NOTE — HISTORY OF PRESENT ILLNESS
[FreeTextEntry1] : Oct 22, 2021\par \par PCP:  Dr. Shannon Wadsworth\par          Rheum:  Dr. Drew Wood                                     \par          Oral Surgeon:  Dr. 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  Started on prednisone about 2 1/2 years  - about 20 mg/d and now 2.5 mg and is now able to turn her neck much better.\par Also receives weekly Actemra, Plaquinil,.   \par \par Started on Fosamax 3/2018  - and stopped about a year ago when she went for a dental implant (has Hx of multiple failed implants)\par                     Oral surgeon   Farheen   - going for another implant in 2 weeks.   \par \par Recent bone density at [facility] on 7/15/21\par \par Lumbar spine, L1 to L4, total: 0.695 gm./cm.2.\par \par T-score: -3.2\par \par Z-score: -1.5\par \par Left hip, femoral neck: 0.583 gm./cm.2.\par \par T-score: -2.4\par \par Z-score: 0.9\par \par Impression:  the recent spine T score of -3.2 indicates that the Fosamax treatment has worn off and she is in need of additional pharmacologic  intervention     She can not take an antiresorptive at this time because of the multiple previous failed dental implants and the need for another dental implant in the future.   Population studies indicate that the spine bone strength would be far better optimized by treating with an anabolic agent at this time, such as Tymlos, Forteo or generic teraparatide for 18-24 months and this\par would be acceptable to the oral surgeon as well.  \par \par Plan:  Updated lab tests.  In May the vitamin D level was sufficient at 48, thyroid function tests were within normal limits and serum calcium was within normal limits at 9.8.  \par A request to her pharmacy will be sent for Tymlos and a prior authoirzation will be completed if indicated.\par She will call me about 10 days after labs have been drawn.

## 2021-10-27 ENCOUNTER — LABORATORY RESULT (OUTPATIENT)
Age: 66
End: 2021-10-27

## 2021-11-02 LAB
CA-I SERPL-SCNC: 1.24 MMOL/L
CALCIUM SERPL-MCNC: 9.9 MG/DL
GLIADIN IGA SER QL: <5 UNITS
GLIADIN IGG SER QL: <5 UNITS
GLIADIN PEPTIDE IGA SER-ACNC: NEGATIVE
GLIADIN PEPTIDE IGG SER-ACNC: NEGATIVE
PARATHYROID HORMONE INTACT: 52 PG/ML
PHOSPHATE SERPL-MCNC: 3.3 MG/DL
TTG IGA SER IA-ACNC: <1.2 U/ML
TTG IGA SER-ACNC: NEGATIVE
TTG IGG SER IA-ACNC: <1.2 U/ML
TTG IGG SER IA-ACNC: NEGATIVE

## 2021-11-03 LAB
ALBUMIN MFR SERPL ELPH: 69.3 %
ALBUMIN SERPL-MCNC: 4.6 G/DL
ALBUMIN/GLOB SERPL: 2.2 RATIO
ALPHA1 GLOB MFR SERPL ELPH: 3.8 %
ALPHA1 GLOB SERPL ELPH-MCNC: 0.3 G/DL
ALPHA2 GLOB MFR SERPL ELPH: 9.8 %
ALPHA2 GLOB SERPL ELPH-MCNC: 0.7 G/DL
B-GLOBULIN MFR SERPL ELPH: 8.9 %
B-GLOBULIN SERPL ELPH-MCNC: 0.6 G/DL
COLLAGEN CTX SERPL-MCNC: 154 PG/ML
GAMMA GLOB FLD ELPH-MCNC: 0.5 G/DL
GAMMA GLOB MFR SERPL ELPH: 8.2 %
INTERPRETATION SERPL IEP-IMP: NORMAL
M PROTEIN SPEC IFE-MCNC: NORMAL
OSTEOCALCIN SERPL-MCNC: 10.9 NG/ML
PROT SERPL-MCNC: 6.7 G/DL
PROT SERPL-MCNC: 6.7 G/DL

## 2021-11-09 ENCOUNTER — APPOINTMENT (OUTPATIENT)
Dept: ENDOCRINOLOGY | Facility: CLINIC | Age: 66
End: 2021-11-09
Payer: MEDICARE

## 2021-11-09 VITALS
DIASTOLIC BLOOD PRESSURE: 80 MMHG | SYSTOLIC BLOOD PRESSURE: 135 MMHG | WEIGHT: 119 LBS | OXYGEN SATURATION: 98 % | HEART RATE: 84 BPM | BODY MASS INDEX: 22.47 KG/M2 | HEIGHT: 61 IN

## 2021-11-09 PROCEDURE — 99214 OFFICE O/P EST MOD 30 MIN: CPT

## 2021-11-09 NOTE — HISTORY OF PRESENT ILLNESS
[FreeTextEntry1] : Nov 09, 2021    in person\par \par PCP:  Dr. Shannon Wadsworth\par          Rheum:  Dr. Drew Wood                                     \par          Oral Surgeon:  Dr. 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  Started on prednisone about 2 1/2 years  - about 20 mg/d and now 2.5 mg \par \par Spine T -3.2.   Has not yet had a chance to go for X-ray spine, but has appointment for early December.\par Also in process of dental implant.\par CTXs.  154\par \par Impression:  X-ray spine will be helpful.\par Appropriate candidate for anabolic\par \par Plan: Instructed in use of Tymlos and she did well.\par \par She will call me in a few days to let me how she is doing and\par she will go for updated labs before the end of the month.  \par \par \par \par Oct 22, 2021\par \par PCP:  Dr. Shannon Wadsworth\par          Rheum:  Dr. Drew Wood                                     \par          Oral Surgeon:  Dr. 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  Started on prednisone about 2 1/2 years  - about 20 mg/d and now 2.5 mg and is now able to turn her neck much better.\par Also receives weekly Actemra, Plaquinil,.   \par \par Started on Fosamax 3/2018  - and stopped about a year ago when she went for a dental implant (has Hx of multiple failed implants)\par                     Oral surgeon   Farheen   - going for another implant in 2 weeks.   \par \par Recent bone density at [facility] on 7/15/21\par \par Lumbar spine, L1 to L4, total: 0.695 gm./cm.2.\par \par T-score: -3.2\par \par Z-score: -1.5\par \par Left hip, femoral neck: 0.583 gm./cm.2.\par \par T-score: -2.4\par \par Z-score: 0.9\par \par Impression:  the recent spine T score of -3.2 indicates that the Fosamax treatment has worn off and she is in need of additional pharmacologic  intervention     She can not take an antiresorptive at this time because of the multiple previous failed dental implants and the need for another dental implant in the future.   Population studies indicate that the spine bone strength would be far better optimized by treating with an anabolic agent at this time, such as Tymlos, Forteo or generic teraparatide for 18-24 months and this\par would be acceptable to the oral surgeon as well.  \par \par Plan:  Updated lab tests.  In May the vitamin D level was sufficient at 48, thyroid function tests were within normal limits and serum calcium was within normal limits at 9.8.  \par A request to her pharmacy will be sent for Tymlos and a prior authoirzation will be completed if indicated.\par She will call me about 10 days after labs have been drawn.

## 2021-11-29 ENCOUNTER — RX RENEWAL (OUTPATIENT)
Age: 66
End: 2021-11-29

## 2021-12-02 ENCOUNTER — RESULT REVIEW (OUTPATIENT)
Age: 66
End: 2021-12-02

## 2021-12-07 ENCOUNTER — RX RENEWAL (OUTPATIENT)
Age: 66
End: 2021-12-07

## 2022-01-06 ENCOUNTER — RX RENEWAL (OUTPATIENT)
Age: 67
End: 2022-01-06

## 2022-01-27 ENCOUNTER — APPOINTMENT (OUTPATIENT)
Dept: RHEUMATOLOGY | Facility: CLINIC | Age: 67
End: 2022-01-27
Payer: MEDICARE

## 2022-01-27 VITALS
HEIGHT: 61 IN | WEIGHT: 118 LBS | DIASTOLIC BLOOD PRESSURE: 80 MMHG | OXYGEN SATURATION: 98 % | HEART RATE: 78 BPM | SYSTOLIC BLOOD PRESSURE: 160 MMHG | TEMPERATURE: 98.1 F | BODY MASS INDEX: 22.28 KG/M2

## 2022-01-27 PROCEDURE — 99214 OFFICE O/P EST MOD 30 MIN: CPT

## 2022-01-27 NOTE — PHYSICAL EXAM
[General Appearance - Alert] : alert [General Appearance - In No Acute Distress] : in no acute distress [General Appearance - Well Nourished] : well nourished [General Appearance - Well Developed] : well developed [Sclera] : the sclera and conjunctiva were normal [Musculoskeletal - Swelling] : no joint swelling seen [] : no rash [Motor Exam] : the motor exam was normal [FreeTextEntry1] : There is improved C spine rotation. There is no active synovitis, including in the elbows.There is swelling of the extensor tendon sheath over the right wrist. There is no tenderness of MCP and PIP joints. 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

## 2022-01-30 LAB
ALBUMIN SERPL ELPH-MCNC: 4.2 G/DL
ALP BLD-CCNC: 191 U/L
ALT SERPL-CCNC: 17 U/L
ANION GAP SERPL CALC-SCNC: 12 MMOL/L
AST SERPL-CCNC: 25 U/L
BILIRUB DIRECT SERPL-MCNC: 0.1 MG/DL
BILIRUB INDIRECT SERPL-MCNC: 0.3 MG/DL
BILIRUB SERPL-MCNC: 0.4 MG/DL
BUN SERPL-MCNC: 17 MG/DL
CALCIUM SERPL-MCNC: 10.4 MG/DL
CHLORIDE SERPL-SCNC: 106 MMOL/L
CO2 SERPL-SCNC: 23 MMOL/L
CREAT SERPL-MCNC: 1.11 MG/DL
GLUCOSE SERPL-MCNC: 111 MG/DL
OSTEOCALCIN SERPL-MCNC: 84.3 NG/ML
PHOSPHATE SERPL-MCNC: 3.3 MG/DL
POTASSIUM SERPL-SCNC: 4.4 MMOL/L
PROT SERPL-MCNC: 5.6 G/DL
SODIUM SERPL-SCNC: 142 MMOL/L

## 2022-01-31 ENCOUNTER — LABORATORY RESULT (OUTPATIENT)
Age: 67
End: 2022-01-31

## 2022-02-01 ENCOUNTER — NON-APPOINTMENT (OUTPATIENT)
Age: 67
End: 2022-02-01

## 2022-02-03 ENCOUNTER — APPOINTMENT (OUTPATIENT)
Dept: ENDOCRINOLOGY | Facility: CLINIC | Age: 67
End: 2022-02-03
Payer: MEDICARE

## 2022-02-03 ENCOUNTER — APPOINTMENT (OUTPATIENT)
Dept: INTERNAL MEDICINE | Facility: CLINIC | Age: 67
End: 2022-02-03
Payer: MEDICARE

## 2022-02-03 VITALS
DIASTOLIC BLOOD PRESSURE: 80 MMHG | OXYGEN SATURATION: 95 % | HEART RATE: 88 BPM | SYSTOLIC BLOOD PRESSURE: 120 MMHG | HEIGHT: 61 IN | BODY MASS INDEX: 22.09 KG/M2 | WEIGHT: 117 LBS

## 2022-02-03 VITALS
TEMPERATURE: 98.2 F | HEIGHT: 61 IN | WEIGHT: 117 LBS | OXYGEN SATURATION: 95 % | SYSTOLIC BLOOD PRESSURE: 134 MMHG | HEART RATE: 88 BPM | BODY MASS INDEX: 22.09 KG/M2 | DIASTOLIC BLOOD PRESSURE: 82 MMHG

## 2022-02-03 PROCEDURE — 99212 OFFICE O/P EST SF 10 MIN: CPT | Mod: 25

## 2022-02-03 PROCEDURE — 99214 OFFICE O/P EST MOD 30 MIN: CPT

## 2022-02-03 RX ORDER — ZOSTER VACCINE RECOMBINANT, ADJUVANTED 50 MCG/0.5
50 KIT INTRAMUSCULAR
Qty: 2 | Refills: 0 | Status: DISCONTINUED | COMMUNITY
Start: 2019-03-28 | End: 2022-02-03

## 2022-02-03 NOTE — REVIEW OF SYSTEMS
[Earache] : no earache [Hearing Loss] : hearing loss [Abdominal Pain] : no abdominal pain [Constipation] : constipation [Melena] : no melena [Negative] : Neurological [FreeTextEntry4] : intermittent L

## 2022-02-03 NOTE — PHYSICAL EXAM
[No Edema] : there was no peripheral edema [Soft] : abdomen soft [Non Tender] : non-tender [Non-distended] : non-distended [No Joint Swelling] : no joint swelling [Grossly Normal Strength/Tone] : grossly normal strength/tone [Normal] : normal gait, coordination grossly intact, no focal deficits and deep tendon reflexes were 2+ and symmetric [de-identified] : R post thoracic vesicles  c crusting

## 2022-02-03 NOTE — HISTORY OF PRESENT ILLNESS
[de-identified] : here for medical f/u last time in the office in June.  She has been diagnosed today with shingles rash after 5 days of burning sensation on her right side of her upper back.  She was started on Valtrex by dermatologist.  She is also on a new medication called Tymlos for osteoporosis treatment while she is undergoing dental implant.  She is to be on Fosamax prior to that.  She thinks Tymlos is giving her constipation and wants to discuss diet and remedies.  Also she has a feeling of muffled sound in her left ear off and on.

## 2022-02-03 NOTE — HEALTH RISK ASSESSMENT
[Never] : Never [0] : 2) Feeling down, depressed, or hopeless: Not at all (0) [PHQ-2 Negative - No further assessment needed] : PHQ-2 Negative - No further assessment needed [[id number]] : 0

## 2022-02-04 LAB
25(OH)D3 SERPL-MCNC: 34 NG/ML
ALBUMIN SERPL ELPH-MCNC: 4.9 G/DL
ALP BLD-CCNC: 141 U/L
ALT SERPL-CCNC: 30 U/L
ANION GAP SERPL CALC-SCNC: 12 MMOL/L
AST SERPL-CCNC: 33 U/L
BASOPHILS # BLD AUTO: 0.05 K/UL
BASOPHILS NFR BLD AUTO: 0.6 %
BILIRUB DIRECT SERPL-MCNC: 0.2 MG/DL
BILIRUB INDIRECT SERPL-MCNC: 0.3 MG/DL
BILIRUB SERPL-MCNC: 0.5 MG/DL
BUN SERPL-MCNC: 24 MG/DL
CA-I SERPL-SCNC: 1.29 MMOL/L
CALCIUM SERPL-MCNC: 10.5 MG/DL
CALCIUM SERPL-MCNC: 10.5 MG/DL
CHLORIDE SERPL-SCNC: 103 MMOL/L
CO2 SERPL-SCNC: 25 MMOL/L
CREAT SERPL-MCNC: 1.35 MG/DL
EOSINOPHIL # BLD AUTO: 0.01 K/UL
EOSINOPHIL NFR BLD AUTO: 0.1 %
GGT SERPL-CCNC: 12 U/L
GLUCOSE SERPL-MCNC: 110 MG/DL
HCT VFR BLD CALC: 43.7 %
HGB BLD-MCNC: 13.9 G/DL
IMM GRANULOCYTES NFR BLD AUTO: 0.4 %
LDH SERPL-CCNC: 264 U/L
LYMPHOCYTES # BLD AUTO: 0.87 K/UL
LYMPHOCYTES NFR BLD AUTO: 11 %
MAN DIFF?: NORMAL
MCHC RBC-ENTMCNC: 29.3 PG
MCHC RBC-ENTMCNC: 31.8 GM/DL
MCV RBC AUTO: 92 FL
MONOCYTES # BLD AUTO: 0.33 K/UL
MONOCYTES NFR BLD AUTO: 4.2 %
NEUTROPHILS # BLD AUTO: 6.62 K/UL
NEUTROPHILS NFR BLD AUTO: 83.7 %
PARATHYROID HORMONE INTACT: 54 PG/ML
PHOSPHATE SERPL-MCNC: 3.2 MG/DL
PLATELET # BLD AUTO: 211 K/UL
POTASSIUM SERPL-SCNC: 4.7 MMOL/L
PROT SERPL-MCNC: 6.3 G/DL
RBC # BLD: 4.75 M/UL
RBC # FLD: 12.2 %
SODIUM SERPL-SCNC: 140 MMOL/L
WBC # FLD AUTO: 7.91 K/UL

## 2022-02-05 LAB — 24R-OH-CALCIDIOL SERPL-MCNC: 15.1 PG/ML

## 2022-02-06 NOTE — HISTORY OF PRESENT ILLNESS
[FreeTextEntry1] : Feb 03, 2022     in person\par \par PCP:  Dr. Shannon Wadsworth\par          Rheum:  Dr. Drew Wood                                     \par          Oral Surgeon:  Dr. Ackerman   Dental implants 1 going on 2. \par \par CC:  Osteoporosis    7/15/21  T scores:  LS -3.2;  FN -2.4       12/2/2021 X-ray spine:  no compressions; mild exaggeration of upper          kyphosis   \par         (RA)  \par \par Now taking Tymlos since Nov 10     By Nov 27 alk phos went from 40 to 191, but by Jan 31 alk phos down to 129 \par \par Creatinine went up to 1.34 from 1.1 and calcium up to 10.7 from 10.4 \par She also notes constipation.\par \par Impression:  Not clear if metabolic changes are a side effect of the Tymlos.   \par \par Plan:  Put Tymlos on "hold"\par Decrease calcium tabs by 1/2 to 600 mg/dl\par Updated labs today, call for results in a few days  and then f/u labs/visit  again in 4 weeks.    \par She will also see her PCP as well. \par \par \par \par Nov 09, 2021    in person\par \par PCP:  Dr. Shannon Wadsworth\par          Rheum:  Dr. Drew Wood                                     \par          Oral Surgeon:  Dr. 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  Started on prednisone about 2 1/2 years  - about 20 mg/d and now 2.5 mg \par \par Spine T -3.2.   Has not yet had a chance to go for X-ray spine, but has appointment for early December.\par Also in process of dental implant.\par CTXs.  154\par \par Impression:  X-ray spine will be helpful.\par Appropriate candidate for anabolic\par \par Plan: Instructed in use of Tymlos and she did well.\par \par She will call me in a few days to let me how she is doing and\par she will go for updated labs before the end of the month.  \par \par \par \par Oct 22, 2021\par \par PCP:  Dr. Shannon Wadsworth\par          Rheum:  Dr. Drew Wood                                     \par          Oral Surgeon:  Dr. 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  Started on prednisone about 2 1/2 years  - about 20 mg/d and now 2.5 mg and is now able to turn her neck much better.\par Also receives weekly Actemra, Plaquinil,.   \par \par Started on Fosamax 3/2018  - and stopped about a year ago when she went for a dental implant (has Hx of multiple failed implants)\par                     Oral surgeon   Farheen   - going for another implant in 2 weeks.   \par \par Recent bone density at [facility] on 7/15/21\par \par Lumbar spine, L1 to L4, total: 0.695 gm./cm.2.\par \par T-score: -3.2\par \par Z-score: -1.5\par \par Left hip, femoral neck: 0.583 gm./cm.2.\par \par T-score: -2.4\par \par Z-score: 0.9\par \par Impression:  the recent spine T score of -3.2 indicates that the Fosamax treatment has worn off and she is in need of additional pharmacologic  intervention     She can not take an antiresorptive at this time because of the multiple previous failed dental implants and the need for another dental implant in the future.   Population studies indicate that the spine bone strength would be far better optimized by treating with an anabolic agent at this time, such as Tymlos, Forteo or generic teraparatide for 18-24 months and this\par would be acceptable to the oral surgeon as well.  \par \par Plan:  Updated lab tests.  In May the vitamin D level was sufficient at 48, thyroid function tests were within normal limits and serum calcium was within normal limits at 9.8.  \par A request to her pharmacy will be sent for Tymlos and a prior authoirzation will be completed if indicated.\par She will call me about 10 days after labs have been drawn.

## 2022-02-06 NOTE — ASSESSMENT
[FreeTextEntry1] : Will need lab tests today and again in a few weeks to monitor trajectory  of\par calcium, alkaline phosphatase. osteocalcin.\par Will keep off of Tymlos for now.\par May require a bone scan

## 2022-02-07 LAB
ESTIMATED AVERAGE GLUCOSE: 117 MG/DL
HBA1C MFR BLD HPLC: 5.7 %

## 2022-02-12 ENCOUNTER — LABORATORY RESULT (OUTPATIENT)
Age: 67
End: 2022-02-12

## 2022-02-14 ENCOUNTER — RX RENEWAL (OUTPATIENT)
Age: 67
End: 2022-02-14

## 2022-02-14 LAB
ANION GAP SERPL CALC-SCNC: 11 MMOL/L
BUN SERPL-MCNC: 15 MG/DL
CALCIUM SERPL-MCNC: 9.5 MG/DL
CHLORIDE SERPL-SCNC: 107 MMOL/L
CO2 SERPL-SCNC: 25 MMOL/L
CREAT SERPL-MCNC: 1.17 MG/DL
GLUCOSE SERPL-MCNC: 80 MG/DL
POTASSIUM SERPL-SCNC: 4.9 MMOL/L
SODIUM SERPL-SCNC: 143 MMOL/L

## 2022-02-24 ENCOUNTER — LABORATORY RESULT (OUTPATIENT)
Age: 67
End: 2022-02-24

## 2022-02-26 ENCOUNTER — APPOINTMENT (OUTPATIENT)
Dept: ENDOCRINOLOGY | Facility: CLINIC | Age: 67
End: 2022-02-26
Payer: MEDICARE

## 2022-02-26 LAB
ALBUMIN MFR SERPL ELPH: 69.8 %
ALBUMIN SERPL-MCNC: 4.4 G/DL
ALBUMIN/GLOB SERPL: 2.3 RATIO
ALP BONE SERPL-MCNC: 48.9 UG/L
ALPHA1 GLOB MFR SERPL ELPH: 3.9 %
ALPHA1 GLOB SERPL ELPH-MCNC: 0.2 G/DL
ALPHA2 GLOB MFR SERPL ELPH: 9.4 %
ALPHA2 GLOB SERPL ELPH-MCNC: 0.6 G/DL
B-GLOBULIN MFR SERPL ELPH: 9 %
B-GLOBULIN SERPL ELPH-MCNC: 0.6 G/DL
COLLAGEN CTX SERPL-MCNC: 1738 PG/ML
GAMMA GLOB FLD ELPH-MCNC: 0.5 G/DL
GAMMA GLOB MFR SERPL ELPH: 7.9 %
INTERPRETATION SERPL IEP-IMP: NORMAL
M PROTEIN SPEC IFE-MCNC: NORMAL
OSTEOCALCIN SERPL-MCNC: 235.6 NG/ML
PROT SERPL-MCNC: 6.3 G/DL
PROT SERPL-MCNC: 6.3 G/DL
PTH RELATED PROT SERPL-MCNC: <2 PMOL/L

## 2022-02-26 PROCEDURE — 99443: CPT | Mod: 95

## 2022-02-26 NOTE — HISTORY OF PRESENT ILLNESS
[Home] : at home, [unfilled] , at the time of the visit. [Medical Office: ([facility])___] : at the medical office located in  [Verbal consent obtained from patient] : the patient, [unfilled] [FreeTextEntry1] : Feb 26, 2022\par \par PCP:  Dr. Shannon Wadsworth\par          Rheum:  Dr. Drew Wood                                     \par          Oral Surgeon:  Dr. Ackerman   Dental implants 1 going on 2. \par \par CC:  Osteoporosis    7/15/21  T scores:  LS -3.2;  FN -2.4       12/2/2021 X-ray spine:  no compressions; mild exaggeration of upper          kyphosis   \par         (RA)  \par \par Dropped off urine today\par Because of hypercalcemia, decreased intake to 600 mg/dl\par Off of the Tymlos  after elevation of calcium, alk phos, BS\par BS, calcium, alk phos, all returned to normal.\par \par For RA, she has been treated with Actemra, chloroquine, prednisone.     Had shingles.\par \par She has dental iimplant work in progress.\par \par Imp:  Appears to have had marked boneover response based on measurments of OC, CTXs.\par \par Plan:  Stay of of PTH analogues.  She will discuss next steps with her oral surgeon and dentist such as the\par proposal for Evenity x 12 months followed by Prolia.\par \par \par \par \par \par Feb 03, 2022     in person\par \par PCP:  Dr. Shannon Wadsworth\par          Rheum:  Dr. Drew Wood                                     \par          Oral Surgeon:  Dr. Ackerman   Dental implants 1 going on 2. \par \par CC:  Osteoporosis    7/15/21  T scores:  LS -3.2;  FN -2.4       12/2/2021 X-ray spine:  no compressions; mild exaggeration of upper          kyphosis   \par         (RA)  \par \par Now taking Tymlos since Nov 10     By Nov 27 alk phos went from 40 to 191, but by Jan 31 alk phos down to 129 \par \par Creatinine went up to 1.34 from 1.1 and calcium up to 10.7 from 10.4 \par She also notes constipation.\par \par Impression:  Not clear if metabolic changes are a side effect of the Tymlos.   \par \par Plan:  Put Tymlos on "hold"\par Decrease calcium tabs by 1/2 to 600 mg/dl\par Updated labs today, call for results in a few days  and then f/u labs/visit  again in 4 weeks.    \par She will also see her PCP as well. \par \par \par \par Nov 09, 2021    in person\par \par PCP:  Dr. Shannon Wadsworth\par          Rheum:  Dr. Drew Wood                                     \par          Oral Surgeon:  Dr. 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  Started on prednisone about 2 1/2 years  - about 20 mg/d and now 2.5 mg \par \par Spine T -3.2.   Has not yet had a chance to go for X-ray spine, but has appointment for early December.\par Also in process of dental implant.\par CTXs.  154\par \par Impression:  X-ray spine will be helpful.\par Appropriate candidate for anabolic\par \par Plan: Instructed in use of Tymlos and she did well.\par \par She will call me in a few days to let me how she is doing and\par she will go for updated labs before the end of the month.  \par \par \par \par Oct 22, 2021\par \par PCP:  Dr. Shannon Wadsworth\par          Rheum:  Dr. Drew Wood                                     \par          Oral Surgeon:  Dr. 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  Started on prednisone about 2 1/2 years  - about 20 mg/d and now 2.5 mg and is now able to turn her neck much better.\par Also receives weekly Actemra, Plaquinil,.   \par \par Started on Fosamax 3/2018  - and stopped about a year ago when she went for a dental implant (has Hx of multiple failed implants)\par                     Oral surgeon   Farheen   - going for another implant in 2 weeks.   \par \par Recent bone density at [facility] on 7/15/21\par \par Lumbar spine, L1 to L4, total: 0.695 gm./cm.2.\par \par T-score: -3.2\par \par Z-score: -1.5\par \par Left hip, femoral neck: 0.583 gm./cm.2.\par \par T-score: -2.4\par \par Z-score: 0.9\par \par Impression:  the recent spine T score of -3.2 indicates that the Fosamax treatment has worn off and she is in need of additional pharmacologic  intervention     She can not take an antiresorptive at this time because of the multiple previous failed dental implants and the need for another dental implant in the future.   Population studies indicate that the spine bone strength would be far better optimized by treating with an anabolic agent at this time, such as Tymlos, Forteo or generic teraparatide for 18-24 months and this\par would be acceptable to the oral surgeon as well.  \par \par Plan:  Updated lab tests.  In May the vitamin D level was sufficient at 48, thyroid function tests were within normal limits and serum calcium was within normal limits at 9.8.  \par A request to her pharmacy will be sent for Tymlos and a prior authoirzation will be completed if indicated.\par She will call me about 10 days after labs have been drawn.

## 2022-03-04 ENCOUNTER — RX RENEWAL (OUTPATIENT)
Age: 67
End: 2022-03-04

## 2022-03-06 LAB
CREAT 24H UR-MCNC: 0.7 G/24 H
CREAT ?TM UR-MCNC: 88 MG/DL
PHOSPHATE/CREAT 24H UR-SRTO: 0.5 G/24 H
PROT ?TM UR-MCNC: 24 HR
SPECIMEN VOL 24H UR: 750 ML
U PHOS: 64.4 MG/DL

## 2022-03-14 ENCOUNTER — APPOINTMENT (OUTPATIENT)
Dept: ENDOCRINOLOGY | Facility: CLINIC | Age: 67
End: 2022-03-14
Payer: MEDICARE

## 2022-03-14 PROCEDURE — 99443: CPT | Mod: 95

## 2022-03-14 NOTE — HISTORY OF PRESENT ILLNESS
[FreeTextEntry1] : Mar 14, 2022         Dr. Hussein Ackerman  p    fax \par \par PCP:  Dr. Shannon Wadsworth\par          Rheum:  Dr. Drew Wood                                     \par          Oral Surgeon:  Dr. Ackerman   Dental implants 1 going on 2. \par \par CC:  Osteoporosis    7/15/21  T scores:  LS -3.2;  FN -2.4       12/2/2021 X-ray spine:  no compressions; mild exaggeration of upper          kyphosis   \par         (RA)  \par \par 65 yo with spine T -3.2, low bone turnover markers.   Long history of dental issues.\par First implant 15 years ago  - only two implants "took" and two didn't take.\par Most recent implant finished up last week (takes 9 months) - She will aim for crown in August - 6 months after completed.   \par It is like a molar on the right.\par She has been told by oral surgeon that she is not going to to need further work on teeth in the near future.\par Started on Tymlos; however, developed elevated calcium with dramatic increase in markers of bone turnover and Tymlos discontinued.   Those chemical changes \par \par Impression:  Osteoporosis of spine;  dental issues winding down.\par \par Plan:  Will inquire of insurnce plan if Evenity can be covered.  -jh\par \par \par \par \par Feb 26, 2022\par \par PCP:  Dr. Shannon Wadsworth\par          Rheum:  Dr. Drew Wood                                     \par          Oral Surgeon:  Dr. Ackerman   Dental implants 1 going on 2. \par \par CC:  Osteoporosis    7/15/21  T scores:  LS -3.2;  FN -2.4       12/2/2021 X-ray spine:  no compressions; mild exaggeration of upper          kyphosis   \par         (RA)  \par \par Dropped off urine today\par Because of hypercalcemia, decreased intake to 600 mg/dl\par Off of the Tymlos  after elevation of calcium, alk phos, BS\par BS, calcium, alk phos, all returned to normal.\par \par For RA, she has been treated with Actemra, chloroquine, prednisone.     Had shingles.\par \par She has dental iimplant work in progress.\par \par Imp:  Appears to have had marked boneover response based on measurments of OC, CTXs.\par \par Plan:  Stay of of PTH analogues.  She will discuss next steps with her oral surgeon and dentist such as the\par proposal for Evenity x 12 months followed by Prolia.\par \par \par \par \par \par Feb 03, 2022     in person\par \par PCP:  Dr. Shannon Wadsworth\par          Rheum:  Dr. Drew Wood                                     \par          Oral Surgeon:  Dr. Ackerman   Dental implants 1 going on 2. \par \par CC:  Osteoporosis    7/15/21  T scores:  LS -3.2;  FN -2.4       12/2/2021 X-ray spine:  no compressions; mild exaggeration of upper          kyphosis   \par         (RA)  \par \par Now taking Tymlos since Nov 10     By Nov 27 alk phos went from 40 to 191, but by Jan 31 alk phos down to 129 \par \par Creatinine went up to 1.34 from 1.1 and calcium up to 10.7 from 10.4 \par She also notes constipation.\par \par Impression:  Not clear if metabolic changes are a side effect of the Tymlos.   \par \par Plan:  Put Tymlos on "hold"\par Decrease calcium tabs by 1/2 to 600 mg/dl\par Updated labs today, call for results in a few days  and then f/u labs/visit  again in 4 weeks.    \par She will also see her PCP as well. \par \par \par \par Nov 09, 2021    in person\par \par PCP:  Dr. Shannon Wadsworth\par          Rheum:  Dr. Drew Wood                                     \par          Oral Surgeon:  Dr. 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  Started on prednisone about 2 1/2 years  - about 20 mg/d and now 2.5 mg \par \par Spine T -3.2.   Has not yet had a chance to go for X-ray spine, but has appointment for early December.\par Also in process of dental implant.\par CTXs.  154\par \par Impression:  X-ray spine will be helpful.\par Appropriate candidate for anabolic\par \par Plan: Instructed in use of Tymlos and she did well.\par \par She will call me in a few days to let me how she is doing and\par she will go for updated labs before the end of the month.  \par \par \par \par Oct 22, 2021\par \par PCP:  Dr. Shannon Wadsworth\par          Rheum:  Dr. Drew Wood                                     \par          Oral Surgeon:  Dr. 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  Started on prednisone about 2 1/2 years  - about 20 mg/d and now 2.5 mg and is now able to turn her neck much better.\par Also receives weekly Actemra, Plaquinil,.   \par \par Started on Fosamax 3/2018  - and stopped about a year ago when she went for a dental implant (has Hx of multiple failed implants)\par                     Oral surgeon   Farheen   - going for another implant in 2 weeks.   \par \par Recent bone density at [facility] on 7/15/21\par \par Lumbar spine, L1 to L4, total: 0.695 gm./cm.2.\par \par T-score: -3.2\par \par Z-score: -1.5\par \par Left hip, femoral neck: 0.583 gm./cm.2.\par \par T-score: -2.4\par \par Z-score: 0.9\par \par Impression:  the recent spine T score of -3.2 indicates that the Fosamax treatment has worn off and she is in need of additional pharmacologic  intervention     She can not take an antiresorptive at this time because of the multiple previous failed dental implants and the need for another dental implant in the future.   Population studies indicate that the spine bone strength would be far better optimized by treating with an anabolic agent at this time, such as Tymlos, Forteo or generic teraparatide for 18-24 months and this\par would be acceptable to the oral surgeon as well.  \par \par Plan:  Updated lab tests.  In May the vitamin D level was sufficient at 48, thyroid function tests were within normal limits and serum calcium was within normal limits at 9.8.  \par A request to her pharmacy will be sent for Tymlos and a prior authoirzation will be completed if indicated.\par She will call me about 10 days after labs have been drawn.

## 2022-04-07 ENCOUNTER — APPOINTMENT (OUTPATIENT)
Dept: ENDOCRINOLOGY | Facility: CLINIC | Age: 67
End: 2022-04-07

## 2022-04-11 ENCOUNTER — RX RENEWAL (OUTPATIENT)
Age: 67
End: 2022-04-11

## 2022-04-20 ENCOUNTER — TRANSCRIPTION ENCOUNTER (OUTPATIENT)
Age: 67
End: 2022-04-20

## 2022-05-12 ENCOUNTER — NON-APPOINTMENT (OUTPATIENT)
Age: 67
End: 2022-05-12

## 2022-05-19 ENCOUNTER — APPOINTMENT (OUTPATIENT)
Dept: INTERNAL MEDICINE | Facility: CLINIC | Age: 67
End: 2022-05-19
Payer: MEDICARE

## 2022-05-19 ENCOUNTER — LABORATORY RESULT (OUTPATIENT)
Age: 67
End: 2022-05-19

## 2022-05-19 VITALS
WEIGHT: 119 LBS | BODY MASS INDEX: 22.47 KG/M2 | SYSTOLIC BLOOD PRESSURE: 122 MMHG | HEART RATE: 72 BPM | DIASTOLIC BLOOD PRESSURE: 84 MMHG | TEMPERATURE: 97.8 F | HEIGHT: 61 IN

## 2022-05-19 DIAGNOSIS — Z86.19 PERSONAL HISTORY OF OTHER INFECTIOUS AND PARASITIC DISEASES: ICD-10-CM

## 2022-05-19 DIAGNOSIS — Z00.00 ENCOUNTER FOR GENERAL ADULT MEDICAL EXAMINATION W/OUT ABNORMAL FINDINGS: ICD-10-CM

## 2022-05-19 DIAGNOSIS — Z86.39 PERSONAL HISTORY OF OTHER ENDOCRINE, NUTRITIONAL AND METABOLIC DISEASE: ICD-10-CM

## 2022-05-19 PROCEDURE — G0439: CPT

## 2022-05-19 PROCEDURE — 99214 OFFICE O/P EST MOD 30 MIN: CPT | Mod: 25

## 2022-05-19 PROCEDURE — 90677 PCV20 VACCINE IM: CPT

## 2022-05-19 PROCEDURE — G0009: CPT

## 2022-05-19 RX ORDER — HALOBETASOL PROPIONATE 0.5 MG/G
0.05 CREAM TOPICAL TWICE DAILY
Qty: 1 | Refills: 3 | Status: ACTIVE | COMMUNITY
Start: 2022-05-19 | End: 1900-01-01

## 2022-05-19 RX ORDER — ABALOPARATIDE 2000 UG/ML
3120 INJECTION, SOLUTION SUBCUTANEOUS DAILY
Qty: 1 | Refills: 11 | Status: DISCONTINUED | COMMUNITY
Start: 2021-10-22 | End: 2022-05-19

## 2022-05-19 NOTE — REVIEW OF SYSTEMS
[Negative] : Heme/Lymph [Fever] : no fever [Chills] : no chills [Fatigue] : fatigue [Vision Problems] : vision problems [Joint Pain] : joint pain [Joint Stiffness] : no joint stiffness [Skin Rash] : no skin rash [FreeTextEntry3] : decreased bilat  [FreeTextEntry9] : L elbow [de-identified] : intermittent paresthesias L arm and leg

## 2022-05-19 NOTE — PHYSICAL EXAM
[Thin] : thin [Normal] : no joint swelling, grossly normal strength/tone [de-identified] : cataracts bilat [de-identified] : bilat dense , no gross lumps [de-identified] : minimal chest erythema

## 2022-05-19 NOTE — HEALTH RISK ASSESSMENT
[Good] : ~his/her~  mood as  good [Never] : Never [0-4] : 0-4 [Yes] : Yes [0] : 2) Feeling down, depressed, or hopeless: Not at all (0) [PHQ-2 Negative - No further assessment needed] : PHQ-2 Negative - No further assessment needed [Patient reported mammogram was normal] : Patient reported mammogram was normal [Patient reported bone density results were abnormal] : Patient reported bone density results were abnormal [Patient reported colonoscopy was normal] : Patient reported colonoscopy was normal [HIV test declined] : HIV test declined [Hepatitis C test offered] : Hepatitis C test offered [Alone] : lives alone [Employed] : employed [College] : College [Single] : single [# Of Children ___] : has [unfilled] children [Fully functional (bathing, dressing, toileting, transferring, walking, feeding)] : Fully functional (bathing, dressing, toileting, transferring, walking, feeding) [Fully functional (using the telephone, shopping, preparing meals, housekeeping, doing laundry, using] : Fully functional and needs no help or supervision to perform IADLs (using the telephone, shopping, preparing meals, housekeeping, doing laundry, using transportation, managing medications and managing finances) [[id number]] : 0 [Reports changes in hearing] : Reports no changes in hearing [Reports changes in vision] : Reports no changes in vision [MammogramDate] : 01/21 [BoneDensityDate] : 01/21 [ColonoscopyDate] : 01/15

## 2022-05-19 NOTE — HISTORY OF PRESENT ILLNESS
[de-identified] : here for wellness exam , doing well, but chronically fatigued, in PT for L tennis elbow , some intermittent tingling L arm and leg

## 2022-05-20 ENCOUNTER — TRANSCRIPTION ENCOUNTER (OUTPATIENT)
Age: 67
End: 2022-05-20

## 2022-05-20 LAB
ALBUMIN SERPL ELPH-MCNC: 5 G/DL
ALP BLD-CCNC: 55 U/L
ALT SERPL-CCNC: 21 U/L
ANION GAP SERPL CALC-SCNC: 13 MMOL/L
APPEARANCE: CLEAR
AST SERPL-CCNC: 27 U/L
BASOPHILS # BLD AUTO: 0.09 K/UL
BASOPHILS NFR BLD AUTO: 1.4 %
BILIRUB SERPL-MCNC: 0.6 MG/DL
BILIRUBIN URINE: NEGATIVE
BLOOD URINE: NEGATIVE
BUN SERPL-MCNC: 15 MG/DL
CALCIUM SERPL-MCNC: 10.3 MG/DL
CHLORIDE SERPL-SCNC: 106 MMOL/L
CHOLEST SERPL-MCNC: 258 MG/DL
CO2 SERPL-SCNC: 26 MMOL/L
COLOR: YELLOW
CREAT SERPL-MCNC: 1.03 MG/DL
EGFR: 60 ML/MIN/1.73M2
EOSINOPHIL # BLD AUTO: 0.05 K/UL
EOSINOPHIL NFR BLD AUTO: 0.8 %
FOLATE SERPL-MCNC: 14.7 NG/ML
GLUCOSE QUALITATIVE U: NEGATIVE
GLUCOSE SERPL-MCNC: 89 MG/DL
HCT VFR BLD CALC: 48.9 %
HDLC SERPL-MCNC: 118 MG/DL
HGB BLD-MCNC: 15.9 G/DL
IMM GRANULOCYTES NFR BLD AUTO: 0.2 %
KETONES URINE: NEGATIVE
LDLC SERPL CALC-MCNC: 116 MG/DL
LEUKOCYTE ESTERASE URINE: NEGATIVE
LYMPHOCYTES # BLD AUTO: 1.68 K/UL
LYMPHOCYTES NFR BLD AUTO: 25.3 %
MAN DIFF?: NORMAL
MCHC RBC-ENTMCNC: 30 PG
MCHC RBC-ENTMCNC: 32.5 GM/DL
MCV RBC AUTO: 92.3 FL
MONOCYTES # BLD AUTO: 0.74 K/UL
MONOCYTES NFR BLD AUTO: 11.1 %
NEUTROPHILS # BLD AUTO: 4.08 K/UL
NEUTROPHILS NFR BLD AUTO: 61.2 %
NITRITE URINE: NEGATIVE
NONHDLC SERPL-MCNC: 140 MG/DL
PH URINE: 6.5
PLATELET # BLD AUTO: 232 K/UL
POTASSIUM SERPL-SCNC: 5 MMOL/L
PROT SERPL-MCNC: 6.5 G/DL
PROTEIN URINE: ABNORMAL
RBC # BLD: 5.3 M/UL
RBC # FLD: 12.3 %
SODIUM SERPL-SCNC: 145 MMOL/L
SPECIFIC GRAVITY URINE: 1.02
T4 FREE SERPL-MCNC: 1.3 NG/DL
TRIGL SERPL-MCNC: 118 MG/DL
TSH SERPL-ACNC: 1.81 UIU/ML
UROBILINOGEN URINE: NORMAL
VIT B12 SERPL-MCNC: 1590 PG/ML
WBC # FLD AUTO: 6.65 K/UL

## 2022-05-22 LAB
ESTIMATED AVERAGE GLUCOSE: 114 MG/DL
HBA1C MFR BLD HPLC: 5.6 %

## 2022-05-24 ENCOUNTER — TRANSCRIPTION ENCOUNTER (OUTPATIENT)
Age: 67
End: 2022-05-24

## 2022-05-26 ENCOUNTER — TRANSCRIPTION ENCOUNTER (OUTPATIENT)
Age: 67
End: 2022-05-26

## 2022-05-27 ENCOUNTER — APPOINTMENT (OUTPATIENT)
Dept: INTERNAL MEDICINE | Facility: CLINIC | Age: 67
End: 2022-05-27
Payer: MEDICARE

## 2022-05-27 VITALS
OXYGEN SATURATION: 97 % | TEMPERATURE: 98.3 F | SYSTOLIC BLOOD PRESSURE: 132 MMHG | HEART RATE: 80 BPM | DIASTOLIC BLOOD PRESSURE: 70 MMHG | HEIGHT: 61 IN

## 2022-05-27 PROCEDURE — 99212 OFFICE O/P EST SF 10 MIN: CPT

## 2022-05-27 NOTE — PHYSICAL EXAM
[Normal] : normal rate, regular rhythm, normal S1 and S2 and no murmur heard [de-identified] : minimally tender R deltoid area [de-identified] : no erythema  R deltoid , slightly warm skin,

## 2022-05-27 NOTE — HISTORY OF PRESENT ILLNESS
[FreeTextEntry8] : Melissa comes in today complaining of right deltoid area swelling and erythema, 2 days after receiving Prevnar 20 vaccine (May 19).  After the vaccine she complained of pain but few days later she developed rash and swelling, she applied ice and took Advil and since yesterday also topical steroid cream.  Today the arm has very little swelling and there is no erythema.

## 2022-05-27 NOTE — REVIEW OF SYSTEMS
[Muscle Pain] : muscle pain [Skin Rash] : skin rash [Negative] : Neurological [FreeTextEntry9] : R deltoid see HPI [de-identified] : R deltoid  much better today

## 2022-06-13 ENCOUNTER — RX RENEWAL (OUTPATIENT)
Age: 67
End: 2022-06-13

## 2022-07-08 ENCOUNTER — APPOINTMENT (OUTPATIENT)
Dept: RHEUMATOLOGY | Facility: CLINIC | Age: 67
End: 2022-07-08

## 2022-07-08 VITALS
OXYGEN SATURATION: 100 % | HEIGHT: 61 IN | SYSTOLIC BLOOD PRESSURE: 150 MMHG | WEIGHT: 119 LBS | BODY MASS INDEX: 22.47 KG/M2 | HEART RATE: 81 BPM | DIASTOLIC BLOOD PRESSURE: 90 MMHG

## 2022-07-08 PROCEDURE — 99214 OFFICE O/P EST MOD 30 MIN: CPT

## 2022-07-08 NOTE — HISTORY OF PRESENT ILLNESS
[FreeTextEntry1] : Patient is taking medications as Rx'ed. Sees ophtho regularly. No flare of RA. "I'm doing great".  No infections since last visit.

## 2022-07-18 ENCOUNTER — RESULT REVIEW (OUTPATIENT)
Age: 67
End: 2022-07-18

## 2022-07-22 ENCOUNTER — APPOINTMENT (OUTPATIENT)
Dept: INTERNAL MEDICINE | Facility: CLINIC | Age: 67
End: 2022-07-22

## 2022-07-22 ENCOUNTER — APPOINTMENT (OUTPATIENT)
Dept: ENDOCRINOLOGY | Facility: CLINIC | Age: 67
End: 2022-07-22

## 2022-07-22 VITALS
SYSTOLIC BLOOD PRESSURE: 122 MMHG | TEMPERATURE: 99.1 F | WEIGHT: 119 LBS | HEART RATE: 88 BPM | HEIGHT: 61 IN | BODY MASS INDEX: 22.47 KG/M2 | OXYGEN SATURATION: 96 % | DIASTOLIC BLOOD PRESSURE: 70 MMHG

## 2022-07-22 PROCEDURE — 99213 OFFICE O/P EST LOW 20 MIN: CPT

## 2022-07-22 PROCEDURE — 99443: CPT | Mod: 95

## 2022-07-22 NOTE — HISTORY OF PRESENT ILLNESS
[FreeTextEntry8] : Chanell come in c/o 2 days ago  epig pain " nervous stomach" after dealing with a difficult patient, also diarrhea, slightly better after Mylanta and pepto bismol, today much better

## 2022-07-22 NOTE — HEALTH RISK ASSESSMENT
[Current] : Current [5-9] : 5-9 [0] : 2) Feeling down, depressed, or hopeless: Not at all (0) [PHQ-2 Negative - No further assessment needed] : PHQ-2 Negative - No further assessment needed [[id number]] : 0

## 2022-07-22 NOTE — REVIEW OF SYSTEMS
[Abdominal Pain] : abdominal pain [Nausea] : nausea [Constipation] : no constipation [Diarrhea] : diarrhea [Vomiting] : no vomiting [Heartburn] : heartburn [Melena] : no melena [Joint Pain] : joint pain [Negative] : Neurological [FreeTextEntry9] : much better

## 2022-07-22 NOTE — PHYSICAL EXAM
[No Acute Distress] : no acute distress [Soft] : abdomen soft [Non Tender] : non-tender [Non-distended] : non-distended [Normal] : normal gait, coordination grossly intact, no focal deficits and deep tendon reflexes were 2+ and symmetric [de-identified] : minimally tender R deltoid area [de-identified] : no erythema  R deltoid , slightly warm skin,

## 2022-07-24 NOTE — HISTORY OF PRESENT ILLNESS
[Home] : at home, [unfilled] , at the time of the visit. [Medical Office: ([facility])___] : at the medical office located in  [Verbal consent obtained from patient] : the patient, [unfilled] [FreeTextEntry1] : Jul 22, 2022        Telephonic\par \par PCP:  Dr. Shannon Wadsworth\par          Rheum:  Dr. Drew Wood                                     \par          Oral Surgeon:  Dr. Ackerman   Dental implants 1 going on 2. \par \par CC:  Osteoporosis    7/15/21  T scores:  LS -3.2;  FN -2.4       12/2/2021 X-ray spine:  no compressions; mild exaggeration of upper          kyphosis   \par         (RA)  \par \par Because of spine T -3.2,and because of dental procedures:  two implants -  given Rx for Tymlos but calcium went to 10.7 and osteocalcin went from 10 to 209 so Tymlos stopped.   f/u BD reviewed by me today and she wishes not to hear result.  \par Transitioning to Evenity and she has been told of PA requirement.\par \par Imp:  Severe osteoporosis of spine with kyphosis.\par Did not tolerate Tymlos.\par \par Plan:  Evenity requiested.\par \par \par \par \par \par Mar 14, 2022         Dr. Hussein Ackerman  p    fax \par \par PCP:  Dr. Shannon Wadsworth\par          Rheum:  Dr. Drew Wood                                     \par          Oral Surgeon:  Dr. Ackerman   Dental implants 1 going on 2. \par \par CC:  Osteoporosis    7/15/21  T scores:  LS -3.2;  FN -2.4       12/2/2021 X-ray spine:  no compressions; mild exaggeration of upper          kyphosis   \par         (RA)  \par \par 65 yo with spine T -3.2, low bone turnover markers.   Long history of dental issues.\par First implant 15 years ago  - only two implants "took" and two didn't take.\par Most recent implant finished up last week (takes 9 months) - She will aim for crown in August - 6 months after completed.   \par It is like a molar on the right.\par She has been told by oral surgeon that she is not going to to need further work on teeth in the near future.\par Started on Tymlos; however, developed elevated calcium with dramatic increase in markers of bone turnover and Tymlos discontinued.   Those chemical changes \par \par Impression:  Osteoporosis of spine;  dental issues winding down.\par \par Plan:  Will inquire of insurnce plan if Evenity can be covered.  -jh\par \par \par \par \par Feb 26, 2022\par \par PCP:  Dr. Shannon Wadsworth\par          Rheum:  Dr. Drew Wood                                     \par          Oral Surgeon:  Dr. Ackerman   Dental implants 1 going on 2. \par \par CC:  Osteoporosis    7/15/21  T scores:  LS -3.2;  FN -2.4       12/2/2021 X-ray spine:  no compressions; mild exaggeration of upper          kyphosis   \par         (RA)  \par \par Dropped off urine today\par Because of hypercalcemia, decreased intake to 600 mg/dl\par Off of the Tymlos  after elevation of calcium, alk phos, BS\par BS, calcium, alk phos, all returned to normal.\par \par For RA, she has been treated with Actemra, chloroquine, prednisone.     Had shingles.\par \par She has dental iimplant work in progress.\par \par Imp:  Appears to have had marked boneover response based on measurments of OC, CTXs.\par \par Plan:  Stay of of PTH analogues.  She will discuss next steps with her oral surgeon and dentist such as the\par proposal for Evenity x 12 months followed by Prolia.\par \par \par \par \par \par Feb 03, 2022     in person\par \par PCP:  Dr. Shannon Wadsworth\par          Rheum:  Dr. Drew Wood                                     \par          Oral Surgeon:  Dr. Ackerman   Dental implants 1 going on 2. \par \par CC:  Osteoporosis    7/15/21  T scores:  LS -3.2;  FN -2.4       12/2/2021 X-ray spine:  no compressions; mild exaggeration of upper          kyphosis   \par         (RA)  \par \par Now taking Tymlos since Nov 10     By Nov 27 alk phos went from 40 to 191, but by Jan 31 alk phos down to 129 \par \par Creatinine went up to 1.34 from 1.1 and calcium up to 10.7 from 10.4 \par She also notes constipation.\par \par Impression:  Not clear if metabolic changes are a side effect of the Tymlos.   \par \par Plan:  Put Tymlos on "hold"\par Decrease calcium tabs by 1/2 to 600 mg/dl\par Updated labs today, call for results in a few days  and then f/u labs/visit  again in 4 weeks.    \par She will also see her PCP as well. \par \par \par \par Nov 09, 2021    in person\par \par PCP:  Dr. Shannon Wadsworth\par          Rheum:  Dr. Drew Wood                                     \par          Oral Surgeon:  Dr. 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  Started on prednisone about 2 1/2 years  - about 20 mg/d and now 2.5 mg \par \par Spine T -3.2.   Has not yet had a chance to go for X-ray spine, but has appointment for early December.\par Also in process of dental implant.\par CTXs.  154\par \par Impression:  X-ray spine will be helpful.\par Appropriate candidate for anabolic\par \par Plan: Instructed in use of Tymlos and she did well.\par \par She will call me in a few days to let me how she is doing and\par she will go for updated labs before the end of the month.  \par \par \par \par Oct 22, 2021\par \par PCP:  Dr. Shannon Wadsworth\par          Rheum:  Dr. Drew Wood                                     \par          Oral Surgeon:  Dr. 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  Started on prednisone about 2 1/2 years  - about 20 mg/d and now 2.5 mg and is now able to turn her neck much better.\par Also receives weekly Actemra, Plaquinil,.   \par \par Started on Fosamax 3/2018  - and stopped about a year ago when she went for a dental implant (has Hx of multiple failed implants)\par                     Oral surgeon   Farheen   - going for another implant in 2 weeks.   \par \par Recent bone density at [facility] on 7/15/21\par \par Lumbar spine, L1 to L4, total: 0.695 gm./cm.2.\par \par T-score: -3.2\par \par Z-score: -1.5\par \par Left hip, femoral neck: 0.583 gm./cm.2.\par \par T-score: -2.4\par \par Z-score: 0.9\par \par Impression:  the recent spine T score of -3.2 indicates that the Fosamax treatment has worn off and she is in need of additional pharmacologic  intervention     She can not take an antiresorptive at this time because of the multiple previous failed dental implants and the need for another dental implant in the future.   Population studies indicate that the spine bone strength would be far better optimized by treating with an anabolic agent at this time, such as Tymlos, Forteo or generic teraparatide for 18-24 months and this\par would be acceptable to the oral surgeon as well.  \par \par Plan:  Updated lab tests.  In May the vitamin D level was sufficient at 48, thyroid function tests were within normal limits and serum calcium was within normal limits at 9.8.  \par A request to her pharmacy will be sent for Tymlos and a prior authoirzation will be completed if indicated.\par She will call me about 10 days after labs have been drawn.

## 2022-07-27 ENCOUNTER — RX RENEWAL (OUTPATIENT)
Age: 67
End: 2022-07-27

## 2022-08-12 ENCOUNTER — NON-APPOINTMENT (OUTPATIENT)
Age: 67
End: 2022-08-12

## 2022-08-30 ENCOUNTER — NON-APPOINTMENT (OUTPATIENT)
Age: 67
End: 2022-08-30

## 2022-09-01 ENCOUNTER — RESULT REVIEW (OUTPATIENT)
Age: 67
End: 2022-09-01

## 2022-10-06 ENCOUNTER — TRANSCRIPTION ENCOUNTER (OUTPATIENT)
Age: 67
End: 2022-10-06

## 2022-10-21 ENCOUNTER — APPOINTMENT (OUTPATIENT)
Dept: INTERNAL MEDICINE | Facility: CLINIC | Age: 67
End: 2022-10-21

## 2022-10-21 ENCOUNTER — NON-APPOINTMENT (OUTPATIENT)
Age: 67
End: 2022-10-21

## 2022-10-21 VITALS
WEIGHT: 119 LBS | HEIGHT: 61 IN | DIASTOLIC BLOOD PRESSURE: 80 MMHG | TEMPERATURE: 98.5 F | OXYGEN SATURATION: 85 % | SYSTOLIC BLOOD PRESSURE: 122 MMHG | BODY MASS INDEX: 22.47 KG/M2 | HEART RATE: 98 BPM

## 2022-10-21 PROCEDURE — 93000 ELECTROCARDIOGRAM COMPLETE: CPT

## 2022-10-21 PROCEDURE — 90662 IIV NO PRSV INCREASED AG IM: CPT

## 2022-10-21 PROCEDURE — 36415 COLL VENOUS BLD VENIPUNCTURE: CPT

## 2022-10-21 PROCEDURE — G0008: CPT

## 2022-10-21 PROCEDURE — 99214 OFFICE O/P EST MOD 30 MIN: CPT | Mod: 25

## 2022-10-21 RX ORDER — HYDROQUINONE 40 MG/G
4 CREAM TOPICAL
Qty: 28 | Refills: 0 | Status: DISCONTINUED | COMMUNITY
Start: 2022-10-13

## 2022-10-21 RX ORDER — VALACYCLOVIR HYDROCHLORIDE 1 G/1
1 TABLET, FILM COATED ORAL 3 TIMES DAILY
Qty: 21 | Refills: 0 | Status: DISCONTINUED | COMMUNITY
Start: 2022-02-03 | End: 2022-10-21

## 2022-10-21 RX ORDER — COVID-19 MOLECULAR TEST ASSAY
KIT MISCELLANEOUS
Qty: 8 | Refills: 0 | Status: DISCONTINUED | COMMUNITY
Start: 2022-09-01

## 2022-10-23 ENCOUNTER — NON-APPOINTMENT (OUTPATIENT)
Age: 67
End: 2022-10-23

## 2022-10-24 ENCOUNTER — RESULT REVIEW (OUTPATIENT)
Age: 67
End: 2022-10-24

## 2022-10-24 LAB
ALBUMIN SERPL ELPH-MCNC: 4.9 G/DL
ALP BLD-CCNC: 41 U/L
ALT SERPL-CCNC: 18 U/L
ANION GAP SERPL CALC-SCNC: 11 MMOL/L
AST SERPL-CCNC: 26 U/L
BASOPHILS # BLD AUTO: 0.08 K/UL
BASOPHILS NFR BLD AUTO: 1.1 %
BILIRUB SERPL-MCNC: 0.4 MG/DL
BUN SERPL-MCNC: 14 MG/DL
CALCIUM SERPL-MCNC: 10.1 MG/DL
CHLORIDE SERPL-SCNC: 103 MMOL/L
CO2 SERPL-SCNC: 26 MMOL/L
CREAT SERPL-MCNC: 1.03 MG/DL
EGFR: 60 ML/MIN/1.73M2
EOSINOPHIL # BLD AUTO: 0.01 K/UL
EOSINOPHIL NFR BLD AUTO: 0.1 %
GLUCOSE SERPL-MCNC: 88 MG/DL
HCT VFR BLD CALC: 46.3 %
HGB BLD-MCNC: 15 G/DL
IMM GRANULOCYTES NFR BLD AUTO: 0.3 %
LYMPHOCYTES # BLD AUTO: 0.91 K/UL
LYMPHOCYTES NFR BLD AUTO: 12.5 %
MAN DIFF?: NORMAL
MCHC RBC-ENTMCNC: 30.5 PG
MCHC RBC-ENTMCNC: 32.4 GM/DL
MCV RBC AUTO: 94.3 FL
MONOCYTES # BLD AUTO: 0.28 K/UL
MONOCYTES NFR BLD AUTO: 3.9 %
NEUTROPHILS # BLD AUTO: 5.96 K/UL
NEUTROPHILS NFR BLD AUTO: 82.1 %
PLATELET # BLD AUTO: 226 K/UL
POTASSIUM SERPL-SCNC: 4.9 MMOL/L
PROT SERPL-MCNC: 6.5 G/DL
RBC # BLD: 4.91 M/UL
RBC # FLD: 12.7 %
SODIUM SERPL-SCNC: 140 MMOL/L
WBC # FLD AUTO: 7.26 K/UL

## 2022-10-24 NOTE — PHYSICAL EXAM
[Thin] : thin [Normal] : normal gait, coordination grossly intact, no focal deficits [de-identified] : cataracts bilat

## 2022-10-24 NOTE — HISTORY OF PRESENT ILLNESS
[No Pertinent Cardiac History] : no history of aortic stenosis, atrial fibrillation, coronary artery disease, recent myocardial infarction, or implantable device/pacemaker [No Pertinent Pulmonary History] : no history of asthma, COPD, sleep apnea, or smoking [No Adverse Anesthesia Reaction] : no adverse anesthesia reaction in self or family member [(Patient denies any chest pain, claudication, dyspnea on exertion, orthopnea, palpitations or syncope)] : Patient denies any chest pain, claudication, dyspnea on exertion, orthopnea, palpitations or syncope [Excellent (>10 METs)] : Excellent (>10 METs) [Chronic Anticoagulation] : no chronic anticoagulation [Chronic Kidney Disease] : no chronic kidney disease [Diabetes] : no diabetes [FreeTextEntry1] : right cataract surgery [FreeTextEntry2] : 11/18 [FreeTextEntry3] : dr CARROLL Lao [FreeTextEntry4] : Chanell comes in today for preoperative evaluation prior to right cataract surgery on November 18.  She feels fine on current treatment noted at her vision is declining especially driving and reading the signs

## 2022-10-24 NOTE — ASSESSMENT
[Patient Optimized for Surgery] : Patient optimized for surgery [No Further Testing Recommended] : no further testing recommended [As per surgery] : as per surgery [FreeTextEntry4] : Ms. BEE  is a 66 year-old female  with no significant history of coronary artery disease.  She  denies chest pain, palpitations or shortness of breath and  her EKG today is normal.  She  doesn't take blood thinners and will continue  her  medications perioperatively as instructed.\par \par Ms. BEE  has a low risk for perioperative cardiovascular complications and will undergo the procedure as planned provided today's blood work is favorable.\par

## 2022-11-11 ENCOUNTER — APPOINTMENT (OUTPATIENT)
Dept: ENDOCRINOLOGY | Facility: CLINIC | Age: 67
End: 2022-11-11

## 2022-11-11 DIAGNOSIS — D64.9 ANEMIA, UNSPECIFIED: ICD-10-CM

## 2022-11-11 PROCEDURE — 99214 OFFICE O/P EST MOD 30 MIN: CPT | Mod: 95

## 2022-11-12 NOTE — HISTORY OF PRESENT ILLNESS
[Home] : at home, [unfilled] , at the time of the visit. [Medical Office: ([facility])___] : at the medical office located in  [Verbal consent obtained from patient] : the patient, [unfilled] [FreeTextEntry1] : Nov 11, 2022    iPhone\par \par PCP:  Dr. Shannon Wadsworth\par          Rheum:  Dr. Drew Wood        on tapering prednison, down to 2.5 mg                                 \par          Oral Surgeon:  Dr. Ackerman   Dental implants 1 going.   \par          Eyes:  Dr.James Lao in [place] for cataracts \par \par CC:  Osteoporosis    7/15/21  T scores:  LS -3.2;  FN -2.4       12/2/2021 X-ray spine:  no compressions; mild exaggeration of upper          kyphosis   \par         (RA)  & fatigue \par         (cataract\par \par Recently went for Evenity #4    No problem               last to be August 2023    \par Could not see ortho because of pelvic pain.    \par \par Imp:  Doing well on Evenity.   Will be going for cataract surgery   \par She will let her oral surgeon know that after 12 months of Evenity, she will transition to Prolia  \par \par \par Jul 22, 2022        Telephonic\par \par PCP:  Dr. Shannon Wadsworth\par          Rheum:  Dr. Drew Wood                                     \par          Oral Surgeon:  Dr. Ackerman   Dental implants 1 going on 2. \par \par CC:  Osteoporosis    7/15/21  T scores:  LS -3.2;  FN -2.4       12/2/2021 X-ray spine:  no compressions; mild exaggeration of upper          kyphosis   \par         (RA)  \par \par Because of spine T -3.2,and because of dental procedures:  two implants -  given Rx for Tymlos but calcium went to 10.7 and osteocalcin went from 10 to 209 so Tymlos stopped.   f/u BD reviewed by me today and she wishes not to hear result.  \par Transitioning to Evenity and she has been told of PA requirement.\par \par Imp:  Severe osteoporosis of spine with kyphosis.\par Did not tolerate Tymlos.\par \par Plan:  Evenity requiested.\par \par \par \par \par \par Mar 14, 2022         Dr. Hussein Ackerman  p    fax \par \par PCP:  Dr. Shannon Wadsworth\par          Rheum:  Dr. Drew Wood                                     \par          Oral Surgeon:  Dr. Ackerman   Dental implants 1 going on 2. \par \par CC:  Osteoporosis    7/15/21  T scores:  LS -3.2;  FN -2.4       12/2/2021 X-ray spine:  no compressions; mild exaggeration of upper          kyphosis   \par         (RA)  \par \par 67 yo with spine T -3.2, low bone turnover markers.   Long history of dental issues.\par First implant 15 years ago  - only two implants "took" and two didn't take.\par Most recent implant finished up last week (takes 9 months) - She will aim for crown in August - 6 months after completed.   \par It is like a molar on the right.\par She has been told by oral surgeon that she is not going to to need further work on teeth in the near future.\par Started on Tymlos; however, developed elevated calcium with dramatic increase in markers of bone turnover and Tymlos discontinued.   Those chemical changes \par \par Impression:  Osteoporosis of spine;  dental issues winding down.\par \par Plan:  Will inquire of insurnce plan if Evenity can be covered.  -jh\par \par \par \par \par Feb 26, 2022\par \par PCP:  Dr. Shannon Wadsworth\par          Rheum:  Dr. Drew Wood                                     \par          Oral Surgeon:  Dr. Ackerman   Dental implants 1 going on 2. \par \par CC:  Osteoporosis    7/15/21  T scores:  LS -3.2;  FN -2.4       12/2/2021 X-ray spine:  no compressions; mild exaggeration of upper          kyphosis   \par         (RA)  \par \par Dropped off urine today\par Because of hypercalcemia, decreased intake to 600 mg/dl\par Off of the Tymlos  after elevation of calcium, alk phos, BS\par BS, calcium, alk phos, all returned to normal.\par \par For RA, she has been treated with Actemra, chloroquine, prednisone.     Had shingles.\par \par She has dental iimplant work in progress.\par \par Imp:  Appears to have had marked boneover response based on measurments of OC, CTXs.\par \par Plan:  Stay of of PTH analogues.  She will discuss next steps with her oral surgeon and dentist such as the\par proposal for Evenity x 12 months followed by Prolia.\par \par \par \par \par \par Feb 03, 2022     in person\par \par PCP:  Dr. Shannon Wadsworth\par          Rheum:  Dr. Drew Wood                                     \par          Oral Surgeon:  Dr. Ackerman   Dental implants 1 going on 2. \par \par CC:  Osteoporosis    7/15/21  T scores:  LS -3.2;  FN -2.4       12/2/2021 X-ray spine:  no compressions; mild exaggeration of upper          kyphosis   \par         (RA)  \par \par Now taking Tymlos since Nov 10     By Nov 27 alk phos went from 40 to 191, but by Jan 31 alk phos down to 129 \par \par Creatinine went up to 1.34 from 1.1 and calcium up to 10.7 from 10.4 \par She also notes constipation.\par \par Impression:  Not clear if metabolic changes are a side effect of the Tymlos.   \par \par Plan:  Put Tymlos on "hold"\par Decrease calcium tabs by 1/2 to 600 mg/dl\par Updated labs today, call for results in a few days  and then f/u labs/visit  again in 4 weeks.    \par She will also see her PCP as well. \par \par \par \par Nov 09, 2021    in person\par \par PCP:  Dr. Shannon Wadsworth\par          Rheum:  Dr. Drew Wood                                     \par          Oral Surgeon:  Dr. 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  Started on prednisone about 2 1/2 years  - about 20 mg/d and now 2.5 mg \par \par Spine T -3.2.   Has not yet had a chance to go for X-ray spine, but has appointment for early December.\par Also in process of dental implant.\par CTXs.  154\par \par Impression:  X-ray spine will be helpful.\par Appropriate candidate for anabolic\par \par Plan: Instructed in use of Tymlos and she did well.\par \par She will call me in a few days to let me how she is doing and\par she will go for updated labs before the end of the month.  \par \par \par \par Oct 22, 2021\par \par PCP:  Dr. Shannon Wadsworth\par          Rheum:  Dr. Drew Wood                                     \par          Oral Surgeon:  Dr. 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  Started on prednisone about 2 1/2 years  - about 20 mg/d and now 2.5 mg and is now able to turn her neck much better.\par Also receives weekly Actemra, Plaquinil,.   \par \par Started on Fosamax 3/2018  - and stopped about a year ago when she went for a dental implant (has Hx of multiple failed implants)\par                     Oral surgeon   Farheen   - going for another implant in 2 weeks.   \par \par Recent bone density at [facility] on 7/15/21\par \par Lumbar spine, L1 to L4, total: 0.695 gm./cm.2.\par \par T-score: -3.2\par \par Z-score: -1.5\par \par Left hip, femoral neck: 0.583 gm./cm.2.\par \par T-score: -2.4\par \par Z-score: 0.9\par \par Impression:  the recent spine T score of -3.2 indicates that the Fosamax treatment has worn off and she is in need of additional pharmacologic  intervention     She can not take an antiresorptive at this time because of the multiple previous failed dental implants and the need for another dental implant in the future.   Population studies indicate that the spine bone strength would be far better optimized by treating with an anabolic agent at this time, such as Tymlos, Forteo or generic teraparatide for 18-24 months and this\par would be acceptable to the oral surgeon as well.  \par \par Plan:  Updated lab tests.  In May the vitamin D level was sufficient at 48, thyroid function tests were within normal limits and serum calcium was within normal limits at 9.8.  \par A request to her pharmacy will be sent for Tymlos and a prior authoirzation will be completed if indicated.\par She will call me about 10 days after labs have been drawn.

## 2022-11-18 ENCOUNTER — AMBULATORY SURGERY CENTER (OUTPATIENT)
Dept: URBAN - METROPOLITAN AREA SURGERY 17 | Facility: SURGERY | Age: 67
Setting detail: OPHTHALMOLOGY
End: 2022-11-18
Payer: MEDICARE

## 2022-11-18 DIAGNOSIS — H52.211: ICD-10-CM

## 2022-11-18 DIAGNOSIS — H25.11: ICD-10-CM

## 2022-11-18 PROCEDURE — 66984 XCAPSL CTRC RMVL W/O ECP: CPT | Performed by: OPHTHALMOLOGY

## 2022-11-18 PROCEDURE — FEMTO CATARACT LASER: Performed by: OPHTHALMOLOGY

## 2022-11-19 ENCOUNTER — OFFICE (OUTPATIENT)
Dept: URBAN - METROPOLITAN AREA CLINIC 29 | Facility: CLINIC | Age: 67
Setting detail: OPHTHALMOLOGY
End: 2022-11-19
Payer: MEDICARE

## 2022-11-19 ENCOUNTER — RX ONLY (RX ONLY)
Age: 67
End: 2022-11-19

## 2022-11-19 DIAGNOSIS — Z79.899: ICD-10-CM

## 2022-11-19 DIAGNOSIS — H16.223: ICD-10-CM

## 2022-11-19 DIAGNOSIS — H25.12: ICD-10-CM

## 2022-11-19 DIAGNOSIS — Z96.1: ICD-10-CM

## 2022-11-19 PROCEDURE — 92136 OPHTHALMIC BIOMETRY: CPT | Performed by: OPHTHALMOLOGY

## 2022-11-19 PROCEDURE — 99024 POSTOP FOLLOW-UP VISIT: CPT | Performed by: OPHTHALMOLOGY

## 2022-11-19 ASSESSMENT — REFRACTION_CURRENTRX
OD_SPHERE: +1.25
OS_OVR_VA: 20/
OD_SPHERE: +1.50
OS_SPHERE: +2.00
OS_SPHERE: +1.25
OD_OVR_VA: 20/
OS_SPHERE: +1.25
OD_OVR_VA: 20/
OD_OVR_VA: 20/
OD_SPHERE: +1.25
OS_OVR_VA: 20/
OS_OVR_VA: 20/

## 2022-11-19 ASSESSMENT — REFRACTION_MANIFEST
OD_CYLINDER: +0.50
OS_SPHERE: -1.50
OD_VA1: 20/30-2
OD_ADD: +2.50
OD_VA1: 20/30
OS_SPHERE: -1.25
OS_ADD: +2.50
OS_VA1: 20/25
OD_SPHERE: -1.25
OD_SPHERE: -1.25
OD_CYLINDER: +0.50
OD_AXIS: 50
OD_AXIS: 050
OS_VA1: 20/50-
OS_AXIS: 105
OS_CYLINDER: +0.75
OS_CYLINDER: +0.75
OS_AXIS: 105

## 2022-11-19 ASSESSMENT — SPHEQUIV_DERIVED
OD_SPHEQUIV: -1
OD_SPHEQUIV: -0.75
OS_SPHEQUIV: -0.875
OS_SPHEQUIV: -0.125
OS_SPHEQUIV: -1.125
OD_SPHEQUIV: -1

## 2022-11-19 ASSESSMENT — VISUAL ACUITY
OS_BCVA: 20/25-1
OD_BCVA: 20/50+2

## 2022-11-19 ASSESSMENT — SUPERFICIAL PUNCTATE KERATITIS (SPK)
OS_SPK: T 1+
OD_SPK: T 1+

## 2022-11-19 ASSESSMENT — REFRACTION_AUTOREFRACTION
OD_AXIS: 052
OD_CYLINDER: +0.50
OS_CYLINDER: +0.75
OS_SPHERE: -0.50
OS_AXIS: 107
OD_SPHERE: -1.00

## 2022-11-19 ASSESSMENT — CONFRONTATIONAL VISUAL FIELD TEST (CVF)
OD_FINDINGS: FULL
OS_FINDINGS: FULL

## 2022-12-01 ENCOUNTER — RESULT REVIEW (OUTPATIENT)
Age: 67
End: 2022-12-01

## 2022-12-08 ENCOUNTER — OFFICE (OUTPATIENT)
Dept: URBAN - METROPOLITAN AREA CLINIC 29 | Facility: CLINIC | Age: 67
Setting detail: OPHTHALMOLOGY
End: 2022-12-08
Payer: MEDICARE

## 2022-12-08 DIAGNOSIS — Z96.1: ICD-10-CM

## 2022-12-08 PROBLEM — H25.12 CATARACT SENILE NUCLEAR SCLEROSIS;  , LEFT EYE: Status: ACTIVE | Noted: 2022-11-19

## 2022-12-08 PROCEDURE — 99024 POSTOP FOLLOW-UP VISIT: CPT | Performed by: OPTOMETRIST

## 2022-12-08 ASSESSMENT — REFRACTION_CURRENTRX
OS_OVR_VA: 20/
OS_SPHERE: +2.00
OS_OVR_VA: 20/
OS_OVR_VA: 20/
OD_OVR_VA: 20/
OS_SPHERE: +1.25
OD_OVR_VA: 20/
OD_SPHERE: +1.50
OS_SPHERE: +1.25
OD_SPHERE: +1.25
OD_OVR_VA: 20/
OD_SPHERE: +1.25

## 2022-12-08 ASSESSMENT — REFRACTION_MANIFEST
OS_AXIS: 105
OD_SPHERE: -1.25
OS_SPHERE: -1.25
OD_CYLINDER: +0.50
OD_VA1: 20/30
OD_CYLINDER: +0.50
OD_CYLINDER: +0.50
OD_AXIS: 015
OS_CYLINDER: +0.75
OS_ADD: +2.50
OD_SPHERE: -1.25
OD_VA1: 20/30-2
OS_SPHERE: -1.50
OD_VA1: 20/20-2
OS_VA1: 20/30
OD_ADD: +2.50
OS_SPHERE: -1.50
OS_AXIS: 105
OD_AXIS: 50
OS_CYLINDER: +0.75
OD_AXIS: 050
OS_AXIS: 105
OS_CYLINDER: +0.75
OS_VA1: 20/25
OD_SPHERE: -0.50
OS_VA1: 20/50-

## 2022-12-08 ASSESSMENT — TONOMETRY: OD_IOP_MMHG: 17

## 2022-12-08 ASSESSMENT — REFRACTION_AUTOREFRACTION
OD_CYLINDER: +0.75
OS_SPHERE: -1.75
OD_SPHERE: -0.25
OD_AXIS: 15
OS_CYLINDER: +0.75
OS_AXIS: 90

## 2022-12-08 ASSESSMENT — SPHEQUIV_DERIVED
OD_SPHEQUIV: -1
OD_SPHEQUIV: -0.25
OD_SPHEQUIV: -1
OS_SPHEQUIV: -1.125
OS_SPHEQUIV: -1.375
OS_SPHEQUIV: -1.125
OS_SPHEQUIV: -0.875
OD_SPHEQUIV: 0.125

## 2022-12-08 ASSESSMENT — VISUAL ACUITY
OS_BCVA: 20/25-1
OD_BCVA: 20/60

## 2022-12-08 ASSESSMENT — SUPERFICIAL PUNCTATE KERATITIS (SPK)
OD_SPK: T 1+
OS_SPK: T 1+

## 2022-12-08 ASSESSMENT — CONFRONTATIONAL VISUAL FIELD TEST (CVF)
OD_FINDINGS: FULL
OS_FINDINGS: FULL

## 2022-12-15 ENCOUNTER — RX RENEWAL (OUTPATIENT)
Age: 67
End: 2022-12-15

## 2023-01-12 ENCOUNTER — APPOINTMENT (OUTPATIENT)
Dept: RHEUMATOLOGY | Facility: CLINIC | Age: 68
End: 2023-01-12
Payer: MEDICARE

## 2023-01-12 VITALS
SYSTOLIC BLOOD PRESSURE: 120 MMHG | WEIGHT: 119 LBS | HEART RATE: 90 BPM | HEIGHT: 61 IN | DIASTOLIC BLOOD PRESSURE: 70 MMHG | OXYGEN SATURATION: 98 % | BODY MASS INDEX: 22.47 KG/M2

## 2023-01-12 PROCEDURE — 99214 OFFICE O/P EST MOD 30 MIN: CPT

## 2023-01-12 NOTE — HISTORY OF PRESENT ILLNESS
[FreeTextEntry1] : Patient is feeling well on current regimen without progression of symptoms. Sees ophtho regularly.  No infections since last visit. Still having symptoms of lateral epicondylitis on the left.

## 2023-01-20 ENCOUNTER — AMBULATORY SURGERY CENTER (OUTPATIENT)
Dept: URBAN - METROPOLITAN AREA SURGERY 17 | Facility: SURGERY | Age: 68
Setting detail: OPHTHALMOLOGY
End: 2023-01-20
Payer: MEDICARE

## 2023-01-20 DIAGNOSIS — H52.212: ICD-10-CM

## 2023-01-20 DIAGNOSIS — H25.12: ICD-10-CM

## 2023-01-20 PROCEDURE — 66984 XCAPSL CTRC RMVL W/O ECP: CPT | Performed by: OPHTHALMOLOGY

## 2023-01-20 PROCEDURE — FEMTO CATARACT LASER: Performed by: OPHTHALMOLOGY

## 2023-01-21 ENCOUNTER — OFFICE (OUTPATIENT)
Dept: URBAN - METROPOLITAN AREA CLINIC 29 | Facility: CLINIC | Age: 68
Setting detail: OPHTHALMOLOGY
End: 2023-01-21

## 2023-01-21 DIAGNOSIS — Z96.1: ICD-10-CM

## 2023-01-21 PROCEDURE — 99024 POSTOP FOLLOW-UP VISIT: CPT | Performed by: OPHTHALMOLOGY

## 2023-01-21 ASSESSMENT — REFRACTION_AUTOREFRACTION
OD_CYLINDER: +0.75
OS_AXIS: 90
OD_AXIS: 15
OS_SPHERE: -1.75
OS_CYLINDER: +0.75
OD_SPHERE: -0.25

## 2023-01-21 ASSESSMENT — SPHEQUIV_DERIVED
OD_SPHEQUIV: -0.25
OS_SPHEQUIV: -1.125
OD_SPHEQUIV: -1
OS_SPHEQUIV: -1.375
OS_SPHEQUIV: -0.875
OD_SPHEQUIV: -1
OS_SPHEQUIV: -1.125
OD_SPHEQUIV: 0.125

## 2023-01-21 ASSESSMENT — REFRACTION_MANIFEST
OS_AXIS: 105
OS_AXIS: 105
OD_AXIS: 015
OS_SPHERE: -1.50
OS_CYLINDER: +0.75
OD_VA1: 20/20-2
OS_AXIS: 105
OS_VA1: 20/50-
OD_CYLINDER: +0.50
OD_SPHERE: -0.50
OS_VA1: 20/30
OD_AXIS: 50
OS_ADD: +2.50
OD_SPHERE: -1.25
OD_CYLINDER: +0.50
OD_SPHERE: -1.25
OS_SPHERE: -1.25
OD_AXIS: 050
OS_VA1: 20/25
OS_SPHERE: -1.50
OD_VA1: 20/30-2
OD_CYLINDER: +0.50
OD_ADD: +2.50
OS_CYLINDER: +0.75
OD_VA1: 20/30
OS_CYLINDER: +0.75

## 2023-01-21 ASSESSMENT — REFRACTION_CURRENTRX
OS_SPHERE: +1.25
OS_SPHERE: +1.25
OS_OVR_VA: 20/
OD_SPHERE: +1.25
OD_OVR_VA: 20/
OS_OVR_VA: 20/
OD_OVR_VA: 20/
OD_SPHERE: +1.50
OS_OVR_VA: 20/
OS_SPHERE: +2.00
OD_SPHERE: +1.25
OD_OVR_VA: 20/

## 2023-01-21 ASSESSMENT — SUPERFICIAL PUNCTATE KERATITIS (SPK)
OD_SPK: T 1+
OS_SPK: T 1+

## 2023-01-21 ASSESSMENT — VISUAL ACUITY
OD_BCVA: 20/50-
OS_BCVA: 20/20-

## 2023-01-21 ASSESSMENT — TONOMETRY
OD_IOP_MMHG: 20
OS_IOP_MMHG: 20

## 2023-01-21 ASSESSMENT — CONFRONTATIONAL VISUAL FIELD TEST (CVF)
OS_FINDINGS: FULL
OD_FINDINGS: FULL

## 2023-02-01 ENCOUNTER — RESULT REVIEW (OUTPATIENT)
Age: 68
End: 2023-02-01

## 2023-02-09 ENCOUNTER — OFFICE (OUTPATIENT)
Dept: URBAN - METROPOLITAN AREA CLINIC 29 | Facility: CLINIC | Age: 68
Setting detail: OPHTHALMOLOGY
End: 2023-02-09
Payer: MEDICARE

## 2023-02-09 DIAGNOSIS — Z96.1: ICD-10-CM

## 2023-02-09 PROCEDURE — 99024 POSTOP FOLLOW-UP VISIT: CPT | Performed by: OPTOMETRIST

## 2023-02-09 ASSESSMENT — REFRACTION_MANIFEST
OS_VA1: 20/25
OD_CYLINDER: +0.50
OS_ADD: +2.50
OD_AXIS: 015
OS_SPHERE: -1.50
OS_SPHERE: -1.25
OS_CYLINDER: +0.75
OS_SPHERE: -0.50
OS_AXIS: 105
OD_AXIS: 015
OD_SPHERE: -0.50
OS_CYLINDER: +0.75
OD_CYLINDER: +0.50
OS_SPHERE: +1.25
OD_VA1: 20/20
OD_VA1: 20/30
OD_SPHERE: +1.25
OD_AXIS: 50
OS_AXIS: 105
OS_VA1: 20/30
OD_VA1: 20/30-2
OS_SPHERE: -1.50
OD_ADD: +2.50
OD_CYLINDER: +0.50
OD_VA1: 20/20-2
OD_AXIS: 050
OS_AXIS: 105
OS_CYLINDER: +0.75
OS_VA1: 20/50-
OD_SPHERE: -1.25
OD_CYLINDER: +0.50
OS_VA1: 20/20
OD_SPHERE: -1.25
OD_SPHERE: -0.50

## 2023-02-09 ASSESSMENT — SPHEQUIV_DERIVED
OD_SPHEQUIV: -0.25
OD_SPHEQUIV: -0.25
OD_SPHEQUIV: -1
OS_SPHEQUIV: -0.875
OS_SPHEQUIV: -1.125
OD_SPHEQUIV: -0.25
OS_SPHEQUIV: -1.125
OD_SPHEQUIV: -1
OS_SPHEQUIV: 0.125

## 2023-02-09 ASSESSMENT — REFRACTION_CURRENTRX
OD_OVR_VA: 20/
OS_SPHERE: +1.25
OS_OVR_VA: 20/
OS_SPHERE: +2.00
OD_SPHERE: +1.50
OD_SPHERE: +1.25
OS_SPHERE: +1.25
OD_SPHERE: +1.25
OS_OVR_VA: 20/
OD_OVR_VA: 20/
OS_OVR_VA: 20/
OD_OVR_VA: 20/

## 2023-02-09 ASSESSMENT — CONFRONTATIONAL VISUAL FIELD TEST (CVF)
OS_FINDINGS: FULL
OD_FINDINGS: FULL

## 2023-02-09 ASSESSMENT — VISUAL ACUITY
OS_BCVA: 20/20-
OD_BCVA: 20/30-2

## 2023-02-09 ASSESSMENT — REFRACTION_AUTOREFRACTION
OS_AXIS: 90
OS_SPHERE: -0.25
OD_SPHERE: -0.50
OD_AXIS: 35
OD_CYLINDER: +0.50
OS_CYLINDER: +0.75

## 2023-02-09 ASSESSMENT — TONOMETRY: OS_IOP_MMHG: 17

## 2023-02-09 ASSESSMENT — SUPERFICIAL PUNCTATE KERATITIS (SPK)
OS_SPK: T 1+
OD_SPK: T 1+

## 2023-02-15 ENCOUNTER — NON-APPOINTMENT (OUTPATIENT)
Age: 68
End: 2023-02-15

## 2023-02-15 ENCOUNTER — APPOINTMENT (OUTPATIENT)
Dept: INTERNAL MEDICINE | Facility: CLINIC | Age: 68
End: 2023-02-15
Payer: MEDICARE

## 2023-02-15 VITALS
HEART RATE: 87 BPM | WEIGHT: 119 LBS | DIASTOLIC BLOOD PRESSURE: 84 MMHG | OXYGEN SATURATION: 98 % | BODY MASS INDEX: 22.47 KG/M2 | HEIGHT: 61 IN | SYSTOLIC BLOOD PRESSURE: 132 MMHG

## 2023-02-15 DIAGNOSIS — B35.1 TINEA UNGUIUM: ICD-10-CM

## 2023-02-15 DIAGNOSIS — E55.9 VITAMIN D DEFICIENCY, UNSPECIFIED: ICD-10-CM

## 2023-02-15 DIAGNOSIS — R92.2 INCONCLUSIVE MAMMOGRAM: ICD-10-CM

## 2023-02-15 DIAGNOSIS — R91.1 SOLITARY PULMONARY NODULE: ICD-10-CM

## 2023-02-15 DIAGNOSIS — E53.8 DEFICIENCY OF OTHER SPECIFIED B GROUP VITAMINS: ICD-10-CM

## 2023-02-15 DIAGNOSIS — M47.892 OTHER SPONDYLOSIS, CERVICAL REGION: ICD-10-CM

## 2023-02-15 DIAGNOSIS — H69.80 OTHER SPECIFIED DISORDERS OF EUSTACHIAN TUBE, UNSPECIFIED EAR: ICD-10-CM

## 2023-02-15 PROCEDURE — 99213 OFFICE O/P EST LOW 20 MIN: CPT

## 2023-02-15 NOTE — HISTORY OF PRESENT ILLNESS
[de-identified] : Chanell comes in for medical follow-up after 2 episodes, brief of dyspnea occurring in the last month.  In both instances she was seated at home and started with a normal inhaling phase and when she was exhaling the air she felt lightheaded.  She is concerned that the current regimen for rheumatoid arthritis can give her dyspnea or may be another reason.  She denies chest pain, chest pressure, diaphoresis,Able to exercise at the gym

## 2023-02-15 NOTE — REVIEW OF SYSTEMS
[Fever] : no fever [Fatigue] : no fatigue [Night Sweats] : no night sweats [Negative] : Neurological [FreeTextEntry6] : see HPI

## 2023-02-16 ENCOUNTER — APPOINTMENT (OUTPATIENT)
Dept: ENDOCRINOLOGY | Facility: CLINIC | Age: 68
End: 2023-02-16
Payer: MEDICARE

## 2023-02-16 PROCEDURE — 99214 OFFICE O/P EST MOD 30 MIN: CPT | Mod: 95

## 2023-02-17 ENCOUNTER — APPOINTMENT (OUTPATIENT)
Dept: CARDIOLOGY | Facility: CLINIC | Age: 68
End: 2023-02-17
Payer: MEDICARE

## 2023-02-17 ENCOUNTER — NON-APPOINTMENT (OUTPATIENT)
Age: 68
End: 2023-02-17

## 2023-02-17 VITALS
WEIGHT: 118.56 LBS | HEIGHT: 61 IN | BODY MASS INDEX: 22.39 KG/M2 | SYSTOLIC BLOOD PRESSURE: 138 MMHG | HEART RATE: 80 BPM | DIASTOLIC BLOOD PRESSURE: 60 MMHG | OXYGEN SATURATION: 99 % | TEMPERATURE: 98.7 F

## 2023-02-17 DIAGNOSIS — Z72.89 OTHER PROBLEMS RELATED TO LIFESTYLE: ICD-10-CM

## 2023-02-17 DIAGNOSIS — Z82.49 FAMILY HISTORY OF ISCHEMIC HEART DISEASE AND OTHER DISEASES OF THE CIRCULATORY SYSTEM: ICD-10-CM

## 2023-02-17 DIAGNOSIS — R00.2 PALPITATIONS: ICD-10-CM

## 2023-02-17 PROCEDURE — 93000 ELECTROCARDIOGRAM COMPLETE: CPT

## 2023-02-17 PROCEDURE — 99205 OFFICE O/P NEW HI 60 MIN: CPT | Mod: 25

## 2023-02-17 PROCEDURE — 93244 EXT ECG>48HR<7D REV&INTERPJ: CPT

## 2023-02-17 NOTE — HISTORY OF PRESENT ILLNESS
[FreeTextEntry1] : Ms. Blount is referred by Dr Wadsworth for dyspnea.\par She noticed this when she was first diagnosed with RA, 3 years ago,intermittently. Most recently 2 weeks ago at rest, post exhalation she feels "lightness in the head".She felt her hearbeat under her left breast with irregularity.\par \par She exercise 4-5 times a week cardio/walking, without difficulty.\par \par She denies chest pain, no syncope.\par \par She is a psychoanalyst.\par \par

## 2023-02-20 NOTE — HISTORY OF PRESENT ILLNESS
[FreeTextEntry1] : Feb 16, 2023      iPhone    \par \par PCP:  Dr. Shannon Wadsworth\par          Rheum:  Dr. Drew Wood        on tapering prednison, down to 2.5 mg                                 \par          Oral Surgeon:  Dr. Ackerman   Dental implants 1 going.   \par          Eyes:  Dr.James Lao in [place] for cataracts \par \par CC:  Osteoporosis    7/15/21  T scores:  LS -3.2;  FN -2.4 \par \par         12/2/2021 X-ray spine:  no compressions; mild exaggeration of upper          kyphosis   \par         (RA)  & fatigue \par         (cataract\par \par 66 yo with RA, visits for osteoporosis -  In July 2021, spine T -3.2;  started on Tymlos 11/2021  but developed hypercalcemia and Tymlos discontinued Feb 2022.   Now on Evenity.\par \par For RA, now on 2 mg prednisone.\par End of Jan 2023  had Evenity #7    vit D 40  \par 2/24/23  will be Evenity * 8,  March 9 and June 12 so July switch to Prolia  \par \par Impression:  Tolerating the Evenity without problem.  Spine T score trajectory is of improvement.  \par \par Plan:  Will aim to transition to Prolia by July.  \par \par \par Nov 11, 2022    iPhone\par \par PCP:  Dr. Shannon Wadsworth\par          Rheum:  Dr. Drew Wood        on tapering prednison, down to 2.5 mg                                 \par          Oral Surgeon:  Dr. Ackerman   Dental implants 1 going.   \par          Eyes:  Dr.James Lao in [place] for cataracts \par \par CC:  Osteoporosis    7/15/21  T scores:  LS -3.2;  FN -2.4       12/2/2021 X-ray spine:  no compressions; mild exaggeration of upper          kyphosis   \par         (RA)  & fatigue \par         (cataract\par \par Recently went for Evenity #4    No problem               last to be August 2023    \par Could not see ortho because of pelvic pain.    \par \par Imp:  Doing well on Evenity.   Will be going for cataract surgery   \par She will let her oral surgeon know that after 12 months of Evenity, she will transition to Prolia  \par \par \par Jul 22, 2022        Telephonic\par \par PCP:  Dr. Shannon Wadsworth\par          Rheum:  Dr. Drew Wood                                     \par          Oral Surgeon:  Dr. Ackerman   Dental implants 1 going on 2. \par \par CC:  Osteoporosis    7/15/21  T scores:  LS -3.2;  FN -2.4       12/2/2021 X-ray spine:  no compressions; mild exaggeration of upper          kyphosis   \par         (RA)  \par \par Because of spine T -3.2,and because of dental procedures:  two implants -  given Rx for Tymlos but calcium went to 10.7 and osteocalcin went from 10 to 209 so Tymlos stopped.   f/u BD reviewed by me today and she wishes not to hear result.  \par Transitioning to Evenity and she has been told of PA requirement.\par \par Imp:  Severe osteoporosis of spine with kyphosis.\par Did not tolerate Tymlos.\par \par Plan:  Evenity requiested.\par \par \par \par \par \par Mar 14, 2022         Dr. Hussein Ackerman  p    fax \par \par PCP:  Dr. Shannon Wadsworth\par          Rheum:  Dr. Drew Wood                                     \par          Oral Surgeon:  Dr. Ackerman   Dental implants 1 going on 2. \par \par CC:  Osteoporosis    7/15/21  T scores:  LS -3.2;  FN -2.4       12/2/2021 X-ray spine:  no compressions; mild exaggeration of upper          kyphosis   \par         (RA)  \par \par 67 yo with spine T -3.2, low bone turnover markers.   Long history of dental issues.\par First implant 15 years ago  - only two implants "took" and two didn't take.\par Most recent implant finished up last week (takes 9 months) - She will aim for crown in August - 6 months after completed.   \par It is like a molar on the right.\par She has been told by oral surgeon that she is not going to to need further work on teeth in the near future.\par Started on Tymlos; however, developed elevated calcium with dramatic increase in markers of bone turnover and Tymlos discontinued.   Those chemical changes \par \par Impression:  Osteoporosis of spine;  dental issues winding down.\par \par Plan:  Will inquire of insurnce plan if Evenity can be covered.  -jh\par \par \par \par \par Feb 26, 2022\par \par PCP:  Dr. Shannon Wadsworth\par          Rheum:  Dr. Drew Wood                                     \par          Oral Surgeon:  Dr. Ackerman   Dental implants 1 going on 2. \par \par CC:  Osteoporosis    7/15/21  T scores:  LS -3.2;  FN -2.4       12/2/2021 X-ray spine:  no compressions; mild exaggeration of upper          kyphosis   \par         (RA)  \par \par Dropped off urine today\par Because of hypercalcemia, decreased intake to 600 mg/dl\par Off of the Tymlos  after elevation of calcium, alk phos, BS\par BS, calcium, alk phos, all returned to normal.\par \par For RA, she has been treated with Actemra, chloroquine, prednisone.     Had shingles.\par \par She has dental iimplant work in progress.\par \par Imp:  Appears to have had marked boneover response based on measurments of OC, CTXs.\par \par Plan:  Stay of of PTH analogues.  She will discuss next steps with her oral surgeon and dentist such as the\par proposal for Evenity x 12 months followed by Prolia.\par \par \par \par \par \par Feb 03, 2022     in person\par \par PCP:  Dr. Shannon Wadsworth\par          Rheum:  Dr. Drew Wood                                     \par          Oral Surgeon:  Dr. Ackerman   Dental implants 1 going on 2. \par \par CC:  Osteoporosis    7/15/21  T scores:  LS -3.2;  FN -2.4       12/2/2021 X-ray spine:  no compressions; mild exaggeration of upper          kyphosis   \par         (RA)  \par \par Now taking Tymlos since Nov 10     By Nov 27 alk phos went from 40 to 191, but by Jan 31 alk phos down to 129 \par \par Creatinine went up to 1.34 from 1.1 and calcium up to 10.7 from 10.4 \par She also notes constipation.\par \par Impression:  Not clear if metabolic changes are a side effect of the Tymlos.   \par \par Plan:  Put Tymlos on "hold"\par Decrease calcium tabs by 1/2 to 600 mg/dl\par Updated labs today, call for results in a few days  and then f/u labs/visit  again in 4 weeks.    \par She will also see her PCP as well. \par \par \par \par Nov 09, 2021    in person\par \par PCP:  Dr. Shannon Wadsworth\par          Rheum:  Dr. Drew Wood                                     \par          Oral Surgeon:  Dr. 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  Started on prednisone about 2 1/2 years  - about 20 mg/d and now 2.5 mg \par \par Spine T -3.2.   Has not yet had a chance to go for X-ray spine, but has appointment for early December.\par Also in process of dental implant.\par CTXs.  154\par \par Impression:  X-ray spine will be helpful.\par Appropriate candidate for anabolic\par \par Plan: Instructed in use of Tymlos and she did well.\par \par She will call me in a few days to let me how she is doing and\par she will go for updated labs before the end of the month.  \par \par \par \par Oct 22, 2021\par \par PCP:  Dr. Shannon Wadsworth\par          Rheum:  Dr. Drew Wood                                     \par          Oral Surgeon:  Dr. 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  Started on prednisone about 2 1/2 years  - about 20 mg/d and now 2.5 mg and is now able to turn her neck much better.\par Also receives weekly Actemra, Plaquinil,.   \par \par Started on Fosamax 3/2018  - and stopped about a year ago when she went for a dental implant (has Hx of multiple failed implants)\par                     Oral surgeon   Farheen   - going for another implant in 2 weeks.   \par \par Recent bone density at [facility] on 7/15/21\par \par Lumbar spine, L1 to L4, total: 0.695 gm./cm.2.\par \par T-score: -3.2\par \par Z-score: -1.5\par \par Left hip, femoral neck: 0.583 gm./cm.2.\par \par T-score: -2.4\par \par Z-score: 0.9\par \par Impression:  the recent spine T score of -3.2 indicates that the Fosamax treatment has worn off and she is in need of additional pharmacologic  intervention     She can not take an antiresorptive at this time because of the multiple previous failed dental implants and the need for another dental implant in the future.   Population studies indicate that the spine bone strength would be far better optimized by treating with an anabolic agent at this time, such as Tymlos, Forteo or generic teraparatide for 18-24 months and this\par would be acceptable to the oral surgeon as well.  \par \par Plan:  Updated lab tests.  In May the vitamin D level was sufficient at 48, thyroid function tests were within normal limits and serum calcium was within normal limits at 9.8.  \par A request to her pharmacy will be sent for Tymlos and a prior authoirzation will be completed if indicated.\par She will call me about 10 days after labs have been drawn.

## 2023-02-21 ENCOUNTER — APPOINTMENT (OUTPATIENT)
Dept: CARDIOLOGY | Facility: CLINIC | Age: 68
End: 2023-02-21
Payer: MEDICARE

## 2023-02-21 ENCOUNTER — RX RENEWAL (OUTPATIENT)
Age: 68
End: 2023-02-21

## 2023-02-21 PROCEDURE — 93246 EXT ECG>7D<15D RECORDING: CPT

## 2023-02-24 ENCOUNTER — RESULT REVIEW (OUTPATIENT)
Age: 68
End: 2023-02-24

## 2023-02-27 ENCOUNTER — TRANSCRIPTION ENCOUNTER (OUTPATIENT)
Age: 68
End: 2023-02-27

## 2023-03-17 ENCOUNTER — APPOINTMENT (OUTPATIENT)
Dept: CARDIOLOGY | Facility: CLINIC | Age: 68
End: 2023-03-17
Payer: MEDICARE

## 2023-03-17 PROCEDURE — 93306 TTE W/DOPPLER COMPLETE: CPT

## 2023-03-20 ENCOUNTER — TRANSCRIPTION ENCOUNTER (OUTPATIENT)
Age: 68
End: 2023-03-20

## 2023-04-20 ENCOUNTER — APPOINTMENT (OUTPATIENT)
Dept: GASTROENTEROLOGY | Facility: CLINIC | Age: 68
End: 2023-04-20

## 2023-05-03 ENCOUNTER — RESULT REVIEW (OUTPATIENT)
Age: 68
End: 2023-05-03

## 2023-05-05 ENCOUNTER — OFFICE (OUTPATIENT)
Dept: URBAN - METROPOLITAN AREA CLINIC 29 | Facility: CLINIC | Age: 68
Setting detail: OPHTHALMOLOGY
End: 2023-05-05
Payer: MEDICARE

## 2023-05-05 DIAGNOSIS — H16.223: ICD-10-CM

## 2023-05-05 DIAGNOSIS — Z79.899: ICD-10-CM

## 2023-05-05 PROCEDURE — 92014 COMPRE OPH EXAM EST PT 1/>: CPT | Performed by: OPHTHALMOLOGY

## 2023-05-05 PROCEDURE — 92134 CPTRZ OPH DX IMG PST SGM RTA: CPT | Performed by: OPHTHALMOLOGY

## 2023-05-05 PROCEDURE — 92083 EXTENDED VISUAL FIELD XM: CPT | Performed by: OPHTHALMOLOGY

## 2023-05-05 ASSESSMENT — REFRACTION_CURRENTRX
OS_OVR_VA: 20/
OD_OVR_VA: 20/
OS_SPHERE: +2.00
OS_SPHERE: +1.25
OD_SPHERE: +1.50
OD_SPHERE: +1.25
OD_OVR_VA: 20/
OS_SPHERE: +1.25
OS_OVR_VA: 20/
OD_OVR_VA: 20/
OS_OVR_VA: 20/
OD_SPHERE: +1.25

## 2023-05-05 ASSESSMENT — REFRACTION_MANIFEST
OS_AXIS: 105
OS_AXIS: 105
OS_SPHERE: +1.25
OS_VA1: 20/30
OD_SPHERE: -0.50
OD_AXIS: 050
OS_SPHERE: -1.50
OD_AXIS: 015
OS_SPHERE: -0.50
OS_SPHERE: -1.50
OD_VA1: 20/30
OS_VA1: 20/50-
OS_CYLINDER: +0.75
OD_CYLINDER: +0.50
OS_CYLINDER: +0.75
OD_CYLINDER: +0.50
OD_SPHERE: -0.50
OS_AXIS: 105
OD_CYLINDER: +0.50
OD_ADD: +2.50
OD_CYLINDER: +0.50
OD_VA1: 20/20-2
OD_SPHERE: -1.25
OD_AXIS: 015
OD_VA1: 20/20
OS_ADD: +2.50
OS_CYLINDER: +0.75
OD_VA1: 20/30-2
OD_SPHERE: +1.25
OS_VA1: 20/20
OS_VA1: 20/25
OD_SPHERE: -1.25
OS_SPHERE: -1.25
OD_AXIS: 50

## 2023-05-05 ASSESSMENT — SPHEQUIV_DERIVED
OD_SPHEQUIV: -0.25
OD_SPHEQUIV: -0.25
OS_SPHEQUIV: -0.875
OD_SPHEQUIV: -1
OD_SPHEQUIV: -1
OD_SPHEQUIV: -0.25
OS_SPHEQUIV: -1.125
OS_SPHEQUIV: 0.125
OS_SPHEQUIV: -1.125

## 2023-05-05 ASSESSMENT — TONOMETRY
OD_IOP_MMHG: 18
OS_IOP_MMHG: 18

## 2023-05-05 ASSESSMENT — REFRACTION_AUTOREFRACTION
OD_CYLINDER: +0.50
OD_SPHERE: -0.50
OS_CYLINDER: +0.75
OS_SPHERE: -0.25
OS_AXIS: 90
OD_AXIS: 35

## 2023-05-05 ASSESSMENT — VISUAL ACUITY
OD_BCVA: 20/25
OS_BCVA: 20/25+1

## 2023-05-05 ASSESSMENT — SUPERFICIAL PUNCTATE KERATITIS (SPK)
OD_SPK: T 1+
OS_SPK: T 1+

## 2023-05-05 ASSESSMENT — CONFRONTATIONAL VISUAL FIELD TEST (CVF)
OD_FINDINGS: FULL
OS_FINDINGS: FULL

## 2023-05-11 ENCOUNTER — RX RENEWAL (OUTPATIENT)
Age: 68
End: 2023-05-11

## 2023-05-12 ENCOUNTER — APPOINTMENT (OUTPATIENT)
Dept: ENDOCRINOLOGY | Facility: CLINIC | Age: 68
End: 2023-05-12
Payer: MEDICARE

## 2023-05-12 VITALS
HEIGHT: 61 IN | WEIGHT: 119 LBS | HEART RATE: 60 BPM | SYSTOLIC BLOOD PRESSURE: 120 MMHG | BODY MASS INDEX: 22.47 KG/M2 | OXYGEN SATURATION: 97 % | DIASTOLIC BLOOD PRESSURE: 76 MMHG

## 2023-05-12 PROCEDURE — 99214 OFFICE O/P EST MOD 30 MIN: CPT

## 2023-05-13 NOTE — HISTORY OF PRESENT ILLNESS
[FreeTextEntry1] : May 12, 2023    in person\par \par PCP:  Dr. Shannon Wadsworth\par          Rheum:  Dr. Drew Wood        on tapering prednison, down to 2.5 mg                                 \par          Oral Surgeon:  Dr. Ackerman   Dental implants 1 going.   \par          Eyes:  Dr.James Lao in [place] for cataracts \par \par CC:  Osteoporosis    7/15/21  T scores:  LS -3.2;  FN -2.4 \par \par         12/2/2021 X-ray spine:  no compressions; mild exaggeration of upper          kyphosis   \par         (RA)  & fatigue \par         (cataract\par \par 66 yo with RA, visits for osteoporosis -  In July 2021, spine T -3.2;  started on Tymlos 11/2021  but developed hypercalcemia and Tymlos discontinued Feb 2022.   Now on Evenity.- will receive #11 next week.    \par \par For RA, now on 2 mg prednisone.\par End of Jan 2023  had Evenity #7    vit D 40  \par 2/24/23  will be Evenity * 8,  March 9 and June 12 so July switch to Prolia  \par \par Impression:  Tolerating the Evenity without problem.  Spine T score trajectory is of improvement. \par She will be going for a follow up bone density by July.  She should also be starting Prolia every 7 months by July (after Evenity #12)\par Plan:  She will discuss dental issues with her oral surgeon.  \par \par \par \par Feb 16, 2023      iPhone    \par \par PCP:  Dr. Shannon Wadsworth\par          Rheum:  Dr. Drew Wood        on tapering prednison, down to 2.5 mg                                 \par          Oral Surgeon:  Dr. Ackerman   Dental implants 1 going.   \par          Eyes:  Dr.James Lao in [place] for cataracts \par \par CC:  Osteoporosis    7/15/21  T scores:  LS -3.2;  FN -2.4 \par \par         12/2/2021 X-ray spine:  no compressions; mild exaggeration of upper          kyphosis   \par         (RA)  & fatigue \par         (cataract\par \par 66 yo with RA, visits for osteoporosis -  In July 2021, spine T -3.2;  started on Tymlos 11/2021  but developed hypercalcemia and Tymlos discontinued Feb 2022.   Now on Evenity.\par \par For RA, now on 2 mg prednisone.\par End of Jan 2023  had Evenity #7    vit D 40  \par 2/24/23  will be Evenity * 8,  March 9 and June 12 so July switch to Prolia  \par \par Impression:  Tolerating the Evenity without problem.  Spine T score trajectory is of improvement.  \par \par Plan:  Will aim to transition to Prolia by July.  \par \par \par Nov 11, 2022    iPhone\par \par PCP:  Dr. Shannon Wadsworth\par          Rheum:  Dr. Drew Wood        on tapering prednison, down to 2.5 mg                                 \par          Oral Surgeon:  Dr. Ackerman   Dental implants 1 going.   \par          Eyes:  Dr.James Lao in [place] for cataracts \par \par CC:  Osteoporosis    7/15/21  T scores:  LS -3.2;  FN -2.4       12/2/2021 X-ray spine:  no compressions; mild exaggeration of upper          kyphosis   \par         (RA)  & fatigue \par         (cataract\par \par Recently went for Evenity #4    No problem               last to be August 2023    \par Could not see ortho because of pelvic pain.    \par \par Imp:  Doing well on Evenity.   Will be going for cataract surgery   \par She will let her oral surgeon know that after 12 months of Evenity, she will transition to Prolia  \par \par \par Jul 22, 2022        Telephonic\par \par PCP:  Dr. Shannon Wadsworth\par          Rheum:  Dr. Drew Wood                                     \par          Oral Surgeon:  Dr. Ackerman   Dental implants 1 going on 2. \par \par CC:  Osteoporosis    7/15/21  T scores:  LS -3.2;  FN -2.4       12/2/2021 X-ray spine:  no compressions; mild exaggeration of upper          kyphosis   \par         (RA)  \par \par Because of spine T -3.2,and because of dental procedures:  two implants -  given Rx for Tymlos but calcium went to 10.7 and osteocalcin went from 10 to 209 so Tymlos stopped.   f/u BD reviewed by me today and she wishes not to hear result.  \par Transitioning to Evenity and she has been told of PA requirement.\par \par Imp:  Severe osteoporosis of spine with kyphosis.\par Did not tolerate Tymlos.\par \par Plan:  Evenity requiested.\par \par \par \par \par \par Mar 14, 2022         Dr. Hussein Ackerman  p    fax \par \par PCP:  Dr. Shannon Wadsworth\par          Rheum:  Dr. Drew Wood                                     \par          Oral Surgeon:  Dr. Ackerman   Dental implants 1 going on 2. \par \par CC:  Osteoporosis    7/15/21  T scores:  LS -3.2;  FN -2.4       12/2/2021 X-ray spine:  no compressions; mild exaggeration of upper          kyphosis   \par         (RA)  \par \par 65 yo with spine T -3.2, low bone turnover markers.   Long history of dental issues.\par First implant 15 years ago  - only two implants "took" and two didn't take.\par Most recent implant finished up last week (takes 9 months) - She will aim for crown in August - 6 months after completed.   \par It is like a molar on the right.\par She has been told by oral surgeon that she is not going to to need further work on teeth in the near future.\par Started on Tymlos; however, developed elevated calcium with dramatic increase in markers of bone turnover and Tymlos discontinued.   Those chemical changes \par \par Impression:  Osteoporosis of spine;  dental issues winding down.\par \par Plan:  Will inquire of insurnce plan if Evenity can be covered.  -jh\par \par \par \par \par Feb 26, 2022\par \par PCP:  Dr. Shannon Wadsworth\par          Rheum:  Dr. Drew Wood                                     \par          Oral Surgeon:  Dr. Ackerman   Dental implants 1 going on 2. \par \par CC:  Osteoporosis    7/15/21  T scores:  LS -3.2;  FN -2.4       12/2/2021 X-ray spine:  no compressions; mild exaggeration of upper          kyphosis   \par         (RA)  \par \par Dropped off urine today\par Because of hypercalcemia, decreased intake to 600 mg/dl\par Off of the Tymlos  after elevation of calcium, alk phos, BS\par BS, calcium, alk phos, all returned to normal.\par \par For RA, she has been treated with Actemra, chloroquine, prednisone.     Had shingles.\par \par She has dental iimplant work in progress.\par \par Imp:  Appears to have had marked boneover response based on measurments of OC, CTXs.\par \par Plan:  Stay of of PTH analogues.  She will discuss next steps with her oral surgeon and dentist such as the\par proposal for Evenity x 12 months followed by Prolia.\par \par \par \par \par \par Feb 03, 2022     in person\par \par PCP:  Dr. Shannon Wadsworth\par          Rheum:  Dr. Drew Wood                                     \par          Oral Surgeon:  Dr. Ackerman   Dental implants 1 going on 2. \par \par CC:  Osteoporosis    7/15/21  T scores:  LS -3.2;  FN -2.4       12/2/2021 X-ray spine:  no compressions; mild exaggeration of upper          kyphosis   \par         (RA)  \par \par Now taking Tymlos since Nov 10     By Nov 27 alk phos went from 40 to 191, but by Jan 31 alk phos down to 129 \par \par Creatinine went up to 1.34 from 1.1 and calcium up to 10.7 from 10.4 \par She also notes constipation.\par \par Impression:  Not clear if metabolic changes are a side effect of the Tymlos.   \par \par Plan:  Put Tymlos on "hold"\par Decrease calcium tabs by 1/2 to 600 mg/dl\par Updated labs today, call for results in a few days  and then f/u labs/visit  again in 4 weeks.    \par She will also see her PCP as well. \par \par \par \par Nov 09, 2021    in person\par \par PCP:  Dr. Shannon Wadsworth\par          Rheum:  Dr. Drew Wood                                     \par          Oral Surgeon:  Dr. 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  Started on prednisone about 2 1/2 years  - about 20 mg/d and now 2.5 mg \par \par Spine T -3.2.   Has not yet had a chance to go for X-ray spine, but has appointment for early December.\par Also in process of dental implant.\par CTXs.  154\par \par Impression:  X-ray spine will be helpful.\par Appropriate candidate for anabolic\par \par Plan: Instructed in use of Tymlos and she did well.\par \par She will call me in a few days to let me how she is doing and\par she will go for updated labs before the end of the month.  \par \par \par \par Oct 22, 2021\par \par PCP:  Dr. Shannon Wadsworth\par          Rheum:  Dr. Drew Wood                                     \par          Oral Surgeon:  Dr. 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  Started on prednisone about 2 1/2 years  - about 20 mg/d and now 2.5 mg and is now able to turn her neck much better.\par Also receives weekly Actemra, Plaquinil,.   \par \par Started on Fosamax 3/2018  - and stopped about a year ago when she went for a dental implant (has Hx of multiple failed implants)\par                     Oral surgeon   Farheen   - going for another implant in 2 weeks.   \par \par Recent bone density at [facility] on 7/15/21\par \par Lumbar spine, L1 to L4, total: 0.695 gm./cm.2.\par \par T-score: -3.2\par \par Z-score: -1.5\par \par Left hip, femoral neck: 0.583 gm./cm.2.\par \par T-score: -2.4\par \par Z-score: 0.9\par \par Impression:  the recent spine T score of -3.2 indicates that the Fosamax treatment has worn off and she is in need of additional pharmacologic  intervention     She can not take an antiresorptive at this time because of the multiple previous failed dental implants and the need for another dental implant in the future.   Population studies indicate that the spine bone strength would be far better optimized by treating with an anabolic agent at this time, such as Tymlos, Forteo or generic teraparatide for 18-24 months and this\par would be acceptable to the oral surgeon as well.  \par \par Plan:  Updated lab tests.  In May the vitamin D level was sufficient at 48, thyroid function tests were within normal limits and serum calcium was within normal limits at 9.8.  \par A request to her pharmacy will be sent for Tymlos and a prior authoirzation will be completed if indicated.\par She will call me about 10 days after labs have been drawn.

## 2023-06-01 ENCOUNTER — RX RENEWAL (OUTPATIENT)
Age: 68
End: 2023-06-01

## 2023-06-01 ENCOUNTER — APPOINTMENT (OUTPATIENT)
Dept: INTERNAL MEDICINE | Facility: CLINIC | Age: 68
End: 2023-06-01
Payer: MEDICARE

## 2023-06-01 VITALS
DIASTOLIC BLOOD PRESSURE: 74 MMHG | TEMPERATURE: 97.7 F | HEART RATE: 77 BPM | SYSTOLIC BLOOD PRESSURE: 112 MMHG | OXYGEN SATURATION: 100 % | WEIGHT: 115.38 LBS | BODY MASS INDEX: 21.79 KG/M2 | HEIGHT: 61 IN

## 2023-06-01 DIAGNOSIS — F51.02 ADJUSTMENT INSOMNIA: ICD-10-CM

## 2023-06-01 DIAGNOSIS — R79.89 OTHER SPECIFIED ABNORMAL FINDINGS OF BLOOD CHEMISTRY: ICD-10-CM

## 2023-06-01 DIAGNOSIS — Z87.39 PERSONAL HISTORY OF OTHER DISEASES OF THE MUSCULOSKELETAL SYSTEM AND CONNECTIVE TISSUE: ICD-10-CM

## 2023-06-01 DIAGNOSIS — Z86.39 PERSONAL HISTORY OF OTHER ENDOCRINE, NUTRITIONAL AND METABOLIC DISEASE: ICD-10-CM

## 2023-06-01 PROCEDURE — 99406 BEHAV CHNG SMOKING 3-10 MIN: CPT

## 2023-06-01 PROCEDURE — 99214 OFFICE O/P EST MOD 30 MIN: CPT | Mod: 25

## 2023-06-01 PROCEDURE — G0439: CPT

## 2023-06-01 PROCEDURE — 36415 COLL VENOUS BLD VENIPUNCTURE: CPT

## 2023-06-01 NOTE — PHYSICAL EXAM
[Thin] : thin [Normal] : normal sclera/conjunctiva, PERRL, EOMI [de-identified] : bilat dense, no  gross lumps

## 2023-06-01 NOTE — REVIEW OF SYSTEMS
[Fever] : no fever [Fatigue] : no fatigue [Night Sweats] : no night sweats [Negative] : Heme/Lymph [FreeTextEntry6] : see HPI

## 2023-06-01 NOTE — HISTORY OF PRESENT ILLNESS
[de-identified] : here for wellness exam,  insomnia for the last week, feels well, , occasional LBP

## 2023-06-01 NOTE — HEALTH RISK ASSESSMENT
[Very Good] : ~his/her~  mood as very good [No falls in past year] : Patient reported no falls in the past year [0] : 2) Feeling down, depressed, or hopeless: Not at all (0) [PHQ-2 Negative - No further assessment needed] : PHQ-2 Negative - No further assessment needed [Patient reported mammogram was normal] : Patient reported mammogram was normal [Patient reported bone density results were abnormal] : Patient reported bone density results were abnormal [Patient reported colonoscopy was normal] : Patient reported colonoscopy was normal [HIV test declined] : HIV test declined [Current] : Current [0-4] : 0-4 [[id number]] : 0 [MammogramDate] : 01/22 [BoneDensityDate] : 07/22 [ColonoscopyDate] : 01/15

## 2023-06-02 LAB
ALBUMIN SERPL ELPH-MCNC: 4.9 G/DL
ALP BLD-CCNC: 33 U/L
ALT SERPL-CCNC: 16 U/L
ANION GAP SERPL CALC-SCNC: 16 MMOL/L
APPEARANCE: CLEAR
AST SERPL-CCNC: 25 U/L
BILIRUB SERPL-MCNC: 0.6 MG/DL
BILIRUBIN URINE: NEGATIVE
BLOOD URINE: NEGATIVE
BUN SERPL-MCNC: 15 MG/DL
CALCIUM SERPL-MCNC: 9.9 MG/DL
CHLORIDE SERPL-SCNC: 104 MMOL/L
CHOLEST SERPL-MCNC: 243 MG/DL
CO2 SERPL-SCNC: 23 MMOL/L
COLOR: YELLOW
CREAT SERPL-MCNC: 1.04 MG/DL
EGFR: 59 ML/MIN/1.73M2
ESTIMATED AVERAGE GLUCOSE: 111 MG/DL
FOLATE SERPL-MCNC: 16.9 NG/ML
GLUCOSE QUALITATIVE U: NEGATIVE MG/DL
GLUCOSE SERPL-MCNC: 95 MG/DL
HBA1C MFR BLD HPLC: 5.5 %
HDLC SERPL-MCNC: 117 MG/DL
KETONES URINE: NEGATIVE MG/DL
LDLC SERPL CALC-MCNC: 99 MG/DL
LEUKOCYTE ESTERASE URINE: NEGATIVE
NITRITE URINE: NEGATIVE
NONHDLC SERPL-MCNC: 126 MG/DL
PH URINE: 7
POTASSIUM SERPL-SCNC: 4.9 MMOL/L
PROT SERPL-MCNC: 6.6 G/DL
PROTEIN URINE: NEGATIVE MG/DL
SODIUM SERPL-SCNC: 143 MMOL/L
SPECIFIC GRAVITY URINE: 1.02
TRIGL SERPL-MCNC: 134 MG/DL
UROBILINOGEN URINE: 0.2 MG/DL
VIT B12 SERPL-MCNC: 1178 PG/ML

## 2023-06-15 ENCOUNTER — NON-APPOINTMENT (OUTPATIENT)
Age: 68
End: 2023-06-15

## 2023-06-16 ENCOUNTER — NON-APPOINTMENT (OUTPATIENT)
Age: 68
End: 2023-06-16

## 2023-07-11 ENCOUNTER — APPOINTMENT (OUTPATIENT)
Dept: INTERNAL MEDICINE | Facility: CLINIC | Age: 68
End: 2023-07-11
Payer: MEDICARE

## 2023-07-11 VITALS
OXYGEN SATURATION: 100 % | SYSTOLIC BLOOD PRESSURE: 112 MMHG | BODY MASS INDEX: 21.55 KG/M2 | WEIGHT: 114.13 LBS | HEART RATE: 78 BPM | DIASTOLIC BLOOD PRESSURE: 70 MMHG | TEMPERATURE: 98.4 F | HEIGHT: 61 IN

## 2023-07-11 PROCEDURE — 99213 OFFICE O/P EST LOW 20 MIN: CPT

## 2023-07-11 NOTE — PHYSICAL EXAM
[Normal] : normal gait, coordination grossly intact, no focal deficits and deep tendon reflexes were 2+ and symmetric [de-identified] : no sinus tenderness

## 2023-07-11 NOTE — HISTORY OF PRESENT ILLNESS
[FreeTextEntry8] : she is here for sinus congestion evaluation\par  on 7/3 she started with nasal congestion. no cough , after recent traveling last week, seen ENT last Fri  , and was started on Abx- amoxicillin now day 4, not better or worse

## 2023-07-11 NOTE — REVIEW OF SYSTEMS
[Night Sweats] : night sweats [Postnasal Drip] : postnasal drip [Negative] : Neurological [Fever] : no fever [Fatigue] : no fatigue [Earache] : no earache [Hearing Loss] : no hearing loss [Sore Throat] : no sore throat

## 2023-07-12 LAB
INFLUENZA A RESULT: NOT DETECTED
INFLUENZA B RESULT: NOT DETECTED
RESP SYN VIRUS RESULT: NOT DETECTED
SARS-COV-2 RESULT: DETECTED

## 2023-07-18 ENCOUNTER — APPOINTMENT (OUTPATIENT)
Dept: RHEUMATOLOGY | Facility: CLINIC | Age: 68
End: 2023-07-18
Payer: MEDICARE

## 2023-07-18 VITALS
BODY MASS INDEX: 21.71 KG/M2 | OXYGEN SATURATION: 99 % | HEIGHT: 61 IN | SYSTOLIC BLOOD PRESSURE: 120 MMHG | HEART RATE: 75 BPM | TEMPERATURE: 98 F | DIASTOLIC BLOOD PRESSURE: 70 MMHG | WEIGHT: 115 LBS

## 2023-07-18 DIAGNOSIS — M53.2X1 SPINAL INSTABILITIES, OCCIPITO-ATLANTO-AXIAL REGION: ICD-10-CM

## 2023-07-18 PROCEDURE — 99214 OFFICE O/P EST MOD 30 MIN: CPT

## 2023-07-18 RX ORDER — ROMOSOZUMAB-AQQG 105 MG/1.17ML
105 INJECTION, SOLUTION SUBCUTANEOUS
Qty: 2 | Refills: 11 | Status: DISCONTINUED | COMMUNITY
Start: 2022-03-14 | End: 2023-07-18

## 2023-07-18 NOTE — HISTORY OF PRESENT ILLNESS
[FreeTextEntry1] : Patient is doing well on current regimen. Has tapered prednisone to 2 mg daily.  Had mild Covid symptoms recently and recovered uneventfully. Sees ophtho regularly.

## 2023-07-20 ENCOUNTER — RESULT REVIEW (OUTPATIENT)
Age: 68
End: 2023-07-20

## 2023-07-24 ENCOUNTER — RX ONLY (RX ONLY)
Age: 68
End: 2023-07-24

## 2023-07-24 ENCOUNTER — APPOINTMENT (OUTPATIENT)
Dept: INTERNAL MEDICINE | Facility: CLINIC | Age: 68
End: 2023-07-24

## 2023-07-24 ENCOUNTER — OFFICE (OUTPATIENT)
Dept: URBAN - METROPOLITAN AREA CLINIC 29 | Facility: CLINIC | Age: 68
Setting detail: OPHTHALMOLOGY
End: 2023-07-24
Payer: MEDICARE

## 2023-07-24 DIAGNOSIS — H16.223: ICD-10-CM

## 2023-07-24 DIAGNOSIS — Z96.1: ICD-10-CM

## 2023-07-24 DIAGNOSIS — H11.31: ICD-10-CM

## 2023-07-24 DIAGNOSIS — Z79.899: ICD-10-CM

## 2023-07-24 PROCEDURE — 92014 COMPRE OPH EXAM EST PT 1/>: CPT | Performed by: OPHTHALMOLOGY

## 2023-07-24 ASSESSMENT — REFRACTION_MANIFEST
OD_CYLINDER: +0.50
OS_CYLINDER: +0.75
OD_CYLINDER: +0.50
OS_ADD: +2.50
OD_AXIS: 015
OS_SPHERE: +1.25
OS_AXIS: 105
OS_CYLINDER: +0.75
OD_AXIS: 50
OS_CYLINDER: +0.75
OD_CYLINDER: +0.50
OS_SPHERE: -1.25
OD_SPHERE: -1.25
OD_VA1: 20/20-2
OD_VA1: 20/30
OD_CYLINDER: +0.50
OD_ADD: +2.50
OS_AXIS: 105
OD_VA1: 20/30-2
OD_SPHERE: -1.25
OD_VA1: 20/20
OD_SPHERE: +1.25
OS_VA1: 20/50-
OS_SPHERE: -1.50
OS_VA1: 20/30
OS_SPHERE: -1.50
OS_AXIS: 105
OD_SPHERE: -0.50
OD_AXIS: 015
OD_SPHERE: -0.50
OS_SPHERE: -0.50
OD_AXIS: 050
OS_VA1: 20/20
OS_VA1: 20/25

## 2023-07-24 ASSESSMENT — REFRACTION_CURRENTRX
OS_SPHERE: +1.25
OD_OVR_VA: 20/
OD_SPHERE: +1.25
OD_SPHERE: +1.25
OS_SPHERE: +1.25
OS_SPHERE: +2.00
OS_OVR_VA: 20/
OD_OVR_VA: 20/
OD_OVR_VA: 20/
OS_OVR_VA: 20/
OD_SPHERE: +1.50
OS_OVR_VA: 20/

## 2023-07-24 ASSESSMENT — VISUAL ACUITY
OS_BCVA: 20/30-1
OD_BCVA: 20/25-1

## 2023-07-24 ASSESSMENT — REFRACTION_AUTOREFRACTION
OS_CYLINDER: +0.75
OS_AXIS: 90
OS_SPHERE: -0.25
OD_AXIS: 35
OD_SPHERE: -0.50
OD_CYLINDER: +0.50

## 2023-07-24 ASSESSMENT — SPHEQUIV_DERIVED
OS_SPHEQUIV: 0.125
OD_SPHEQUIV: -0.25
OS_SPHEQUIV: -1.125
OD_SPHEQUIV: -0.25
OD_SPHEQUIV: -0.25
OD_SPHEQUIV: -1
OD_SPHEQUIV: -1
OS_SPHEQUIV: -1.125
OS_SPHEQUIV: -0.875

## 2023-07-24 ASSESSMENT — CONFRONTATIONAL VISUAL FIELD TEST (CVF)
OD_FINDINGS: FULL
OS_FINDINGS: FULL

## 2023-07-24 ASSESSMENT — SUPERFICIAL PUNCTATE KERATITIS (SPK)
OS_SPK: T 1+
OD_SPK: T 1+

## 2023-07-27 ENCOUNTER — APPOINTMENT (OUTPATIENT)
Dept: ENDOCRINOLOGY | Facility: CLINIC | Age: 68
End: 2023-07-27
Payer: MEDICARE

## 2023-07-27 ENCOUNTER — RESULT REVIEW (OUTPATIENT)
Age: 68
End: 2023-07-27

## 2023-07-27 DIAGNOSIS — D86.9 SARCOIDOSIS, UNSPECIFIED: ICD-10-CM

## 2023-07-27 PROCEDURE — 99214 OFFICE O/P EST MOD 30 MIN: CPT | Mod: 95

## 2023-07-27 NOTE — HISTORY OF PRESENT ILLNESS
[Home] : at home, [unfilled] , at the time of the visit. [Medical Office: ([facility])___] : at the medical office located in  [Verbal consent obtained from patient] : the patient, [unfilled] [FreeTextEntry1] : Jul 27, 2023       nip\par \par PCP:  Dr. Shannon Wadsworth\par          Rheum:  Dr. Drew Wood        on tapering prednison, down to 2.5 mg                                 \par          Oral Surgeon:  Dr. Akcerman   Dental implants 1 going.   \par          Eyes:  Dr.James Lao in [place] for cataracts \par \par CC:  Osteoporosis    7/15/21  T scores:  LS -3.2;  FN -2.4 \par                                  7/20/23  T scores:   LS -1.6;  FN  -1.9;  TH -0.9   \par \par         12/2/2021 X-ray spine:  no compressions; mild exaggeration of upper          kyphosis   \par         (RA)  & fatigue \par         (cataract\par \par 66 yo with RA, visits for osteoporosis -  In July 2021, spine T -3.2;  started on Tymlos 11/2021  but developed hypercalcemia and Tymlos discontinued Feb 2022.   Now on Evenity, just completed Evenity #12.\par Went for bone density 7/20/23 and spine T -1.6  .- \par \par Imp:  Dramatic improvement in spine T score.  She will have X-ray spine to confirm this does not reflect compressions.\par Her oral surgeon will not allow her to transition to an antiresorptive, so she will return ) to (the lesser Tymlos \par \par \par May 12, 2023    in person\par \par PCP:  Dr. Shannon Wadsworth\par          Rheum:  Dr. Drew Wood        on tapering prednison, down to 2.5 mg                                 \par          Oral Surgeon:  Dr. Ackerman   Dental implants 1 going.   \par          Eyes:  Dr.James Lao in [place] for cataracts \par \par CC:  Osteoporosis    7/15/21  T scores:  LS -3.2;  FN -2.4 \par \par         12/2/2021 X-ray spine:  no compressions; mild exaggeration of upper          kyphosis   \par         (RA)  & fatigue \par         (cataract\par \par 66 yo with RA, visits for osteoporosis -  In July 2021, spine T -3.2;  started on Tymlos 11/2021  but developed hypercalcemia and Tymlos discontinued Feb 2022.   Now on Evenity.- will receive #11 next week.    \par \par For RA, now on 2 mg prednisone.\par End of Jan 2023  had Evenity #7    vit D 40  \par 2/24/23  will be Evenity * 8, March 9 and June 12 so July switch to Prolia  \par \par Impression:  Tolerating the Evenity without problem.  Spine T score trajectory is of improvement. \par She will be going for a follow up bone density by July.  She should also be starting Prolia every 7 months by July (after Evenity #12)\par Plan:  She will discuss dental issues with her oral surgeon.  \par \par \par \par Feb 16, 2023      iPhone    \par \par PCP:  Dr. Shannon Wadsworth\par          Rheum:  Dr. Drew Wood        on tapering prednison, down to 2.5 mg                                 \par          Oral Surgeon:  Dr. Ackerman   Dental implants 1 going.   \par          Eyes:  Dr.James Lao in [place] for cataracts \par \par CC:  Osteoporosis    7/15/21  T scores:  LS -3.2;  FN -2.4 \par \par         12/2/2021 X-ray spine:  no compressions; mild exaggeration of upper          kyphosis   \par         (RA)  & fatigue \par         (cataract\par \par 66 yo with RA, visits for osteoporosis -  In July 2021, spine T -3.2;  started on Tymlos 11/2021  but developed hypercalcemia and Tymlos discontinued Feb 2022.   Now on Evenity.\par \par For RA, now on 2 mg prednisone.\par End of Jan 2023  had Evenity #7    vit D 40  \par 2/24/23  will be Evenity * 8, March 9 and June 12 so July switch to Prolia  \par \par Impression:  Tolerating the Evenity without problem.  Spine T score trajectory is of improvement.  \par \par Plan:  Will aim to transition to Prolia by July.  \par \par \par Nov 11, 2022    iPhone\par \par PCP:  Dr. Shannon Wadsworth\par          Rheum:  Dr. Drew Wood        on tapering prednison, down to 2.5 mg                                 \par          Oral Surgeon:  Dr. Ackerman   Dental implants 1 going.   \par          Eyes:  Dr.James Lao in [place] for cataracts \par \par CC:  Osteoporosis    7/15/21  T scores:  LS -3.2;  FN -2.4       12/2/2021 X-ray spine:  no compressions; mild exaggeration of upper          kyphosis   \par         (RA)  & fatigue \par         (cataract\par \par Recently went for Evenity #4    No problem               last to be August 2023    \par Could not see ortho because of pelvic pain.    \par \par Imp:  Doing well on Evenity.   Will be going for cataract surgery   \par She will let her oral surgeon know that after 12 months of Evenity, she will transition to Prolia  \par \par \par Jul 22, 2022        Telephonic\par \par PCP:  Dr. Shannon Wadsworth\par          Rheum:  Dr. Drew Wood                                     \par          Oral Surgeon:  Dr. Ackerman   Dental implants 1 going on 2. \par \par CC:  Osteoporosis    7/15/21  T scores:  LS -3.2;  FN -2.4       12/2/2021 X-ray spine:  no compressions; mild exaggeration of upper          kyphosis   \par         (RA)  \par \par Because of spine T -3.2,and because of dental procedures:  two implants -  given Rx for Tymlos but calcium went to 10.7 and osteocalcin went from 10 to 209 so Tymlos stopped.   f/u BD reviewed by me today and she wishes not to hear result.  \par Transitioning to Evenity and she has been told of PA requirement.\par \par Imp:  Severe osteoporosis of spine with kyphosis.\par Did not tolerate Tymlos.\par \par Plan:  Evenity requiested.\par \par \par \par \par \par Mar 14, 2022         Dr. Hussein Ackerman  p    fax \par \par PCP:  Dr. Shannon Wadsworth\par          Rheum:  Dr. Drew Wood                                     \par          Oral Surgeon:  Dr. Ackerman   Dental implants 1 going on 2. \par \par CC:  Osteoporosis    7/15/21  T scores:  LS -3.2;  FN -2.4       12/2/2021 X-ray spine:  no compressions; mild exaggeration of upper          kyphosis   \par         (RA)  \par \par 65 yo with spine T -3.2, low bone turnover markers.   Long history of dental issues.\par First implant 15 years ago  - only two implants "took" and two didn't take.\par Most recent implant finished up last week (takes 9 months) - She will aim for crown in August - 6 months after completed.   \par It is like a molar on the right.\par She has been told by oral surgeon that she is not going to to need further work on teeth in the near future.\par Started on Tymlos; however, developed elevated calcium with dramatic increase in markers of bone turnover and Tymlos discontinued.   Those chemical changes \par \par Impression:  Osteoporosis of spine;  dental issues winding down.\par \par Plan:  Will inquire of insurnce plan if Evenity can be covered.  -jh\par \par \par \par \par Feb 26, 2022\par \par PCP:  Dr. Shannon Wadsworth\par          Rheum:  Dr. Drew Wood                                     \par          Oral Surgeon:  Dr. Ackerman   Dental implants 1 going on 2. \par \par CC:  Osteoporosis    7/15/21  T scores:  LS -3.2;  FN -2.4       12/2/2021 X-ray spine:  no compressions; mild exaggeration of upper          kyphosis   \par         (RA)  \par \par Dropped off urine today\par Because of hypercalcemia, decreased intake to 600 mg/dl\par Off of the Tymlos  after elevation of calcium, alk phos, BS\par BS, calcium, alk phos, all returned to normal.\par \par For RA, she has been treated with Actemra, chloroquine, prednisone.     Had shingles.\par \par She has dental iimplant work in progress.\par \par Imp:  Appears to have had marked boneover response based on measurments of OC, CTXs.\par \par Plan:  Stay of of PTH analogues.  She will discuss next steps with her oral surgeon and dentist such as the\par proposal for Evenity x 12 months followed by Prolia.\par \par \par \par \par \par Feb 03, 2022     in person\par \par PCP:  Dr. Shannon Wadsworth\par          Rheum:  Dr. Drew Wood                                     \par          Oral Surgeon:  Dr. Ackerman   Dental implants 1 going on 2. \par \par CC:  Osteoporosis    7/15/21  T scores:  LS -3.2;  FN -2.4       12/2/2021 X-ray spine:  no compressions; mild exaggeration of upper          kyphosis   \par         (RA)  \par \par Now taking Tymlos since Nov 10     By Nov 27 alk phos went from 40 to 191, but by Jan 31 alk phos down to 129 \par \par Creatinine went up to 1.34 from 1.1 and calcium up to 10.7 from 10.4 \par She also notes constipation.\par \par Impression:  Not clear if metabolic changes are a side effect of the Tymlos.   \par \par Plan:  Put Tymlos on "hold"\par Decrease calcium tabs by 1/2 to 600 mg/dl\par Updated labs today, call for results in a few days  and then f/u labs/visit  again in 4 weeks.    \par She will also see her PCP as well. \par \par \par \par Nov 09, 2021    in person\par \par PCP:  Dr. Shannon Wadsworth\par          Rheum:  Dr. Drew Wood                                     \par          Oral Surgeon:  Dr. 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  Started on prednisone about 2 1/2 years  - about 20 mg/d and now 2.5 mg \par \par Spine T -3.2.   Has not yet had a chance to go for X-ray spine, but has appointment for early December.\par Also in process of dental implant.\par CTXs.  154\par \par Impression:  X-ray spine will be helpful.\par Appropriate candidate for anabolic\par \par Plan: Instructed in use of Tymlos and she did well.\par \par She will call me in a few days to let me how she is doing and\par she will go for updated labs before the end of the month.  \par \par \par \par Oct 22, 2021\par \par PCP:  Dr. Shannon Wadsworth\par          Rheum:  Dr. Drew Wood                                     \par          Oral Surgeon:  Dr. 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  Started on prednisone about 2 1/2 years  - about 20 mg/d and now 2.5 mg and is now able to turn her neck much better.\par Also receives weekly Actemra, Plaquinil,.   \par \par Started on Fosamax 3/2018  - and stopped about a year ago when she went for a dental implant (has Hx of multiple failed implants)\par                     Oral surgeon   [place]   - going for another implant in 2 weeks.   \par \par Recent bone density at [facility] on 7/15/21\par \par Lumbar spine, L1 to L4, total: 0.695 gm./cm.2.\par \par T-score: -3.2\par \par Z-score: -1.5\par \par Left hip, femoral neck: 0.583 gm./cm.2.\par \par T-score: -2.4\par \par Z-score: 0.9\par \par Impression:  the recent spine T score of -3.2 indicates that the Fosamax treatment has worn off and she is in need of additional pharmacologic  intervention     She can not take an antiresorptive at this time because of the multiple previous failed dental implants and the need for another dental implant in the future.   Population studies indicate that the spine bone strength would be far better optimized by treating with an anabolic agent at this time, such as Tymlos, Forteo or generic teraparatide for 18-24 months and this\par would be acceptable to the oral surgeon as well.  \par \par Plan:  Updated lab tests.  In May the vitamin D level was sufficient at 48, thyroid function tests were within normal limits and serum calcium was within normal limits at 9.8.  \par A request to her pharmacy will be sent for Tymlos and a prior authoirzation will be completed if indicated.\par She will call me about 10 days after labs have been drawn.

## 2023-08-01 ENCOUNTER — RX RENEWAL (OUTPATIENT)
Age: 68
End: 2023-08-01

## 2023-08-11 ENCOUNTER — RX RENEWAL (OUTPATIENT)
Age: 68
End: 2023-08-11

## 2023-08-29 ENCOUNTER — NON-APPOINTMENT (OUTPATIENT)
Age: 68
End: 2023-08-29

## 2023-08-31 ENCOUNTER — RESULT REVIEW (OUTPATIENT)
Age: 68
End: 2023-08-31

## 2023-08-31 ENCOUNTER — APPOINTMENT (OUTPATIENT)
Dept: INTERNAL MEDICINE | Facility: CLINIC | Age: 68
End: 2023-08-31
Payer: MEDICARE

## 2023-08-31 VITALS
SYSTOLIC BLOOD PRESSURE: 104 MMHG | DIASTOLIC BLOOD PRESSURE: 62 MMHG | WEIGHT: 117 LBS | TEMPERATURE: 98.5 F | HEART RATE: 71 BPM | HEIGHT: 61 IN | BODY MASS INDEX: 22.09 KG/M2 | OXYGEN SATURATION: 98 %

## 2023-08-31 VITALS — DIASTOLIC BLOOD PRESSURE: 74 MMHG | SYSTOLIC BLOOD PRESSURE: 116 MMHG

## 2023-08-31 DIAGNOSIS — D51.0 VITAMIN B12 DEFICIENCY ANEMIA DUE TO INTRINSIC FACTOR DEFICIENCY: ICD-10-CM

## 2023-08-31 PROCEDURE — 99214 OFFICE O/P EST MOD 30 MIN: CPT

## 2023-08-31 NOTE — PHYSICAL EXAM
[No Edema] : there was no peripheral edema [Normal] : normal gait, coordination grossly intact, no focal deficits and deep tendon reflexes were 2+ and symmetric [de-identified] : decreased neck mobility, good strength fingers bilat

## 2023-08-31 NOTE — HISTORY OF PRESENT ILLNESS
[FreeTextEntry8] : Chanell comes in for medical evaluation of several issues.  1. she has an  acute weakness in her  pinky she feels it more when she types.  No pain.  2 she feels very fatigued, she thinks being back on Tymlos in anticipation of oral surgery, her calcium may be high.  She had blood work done this morning ordered by Dr. Hellerman.  3 blood pressure check.

## 2023-08-31 NOTE — REVIEW OF SYSTEMS
[Fever] : no fever [Chills] : no chills [Fatigue] : fatigue [Joint Pain] : joint pain [Muscle Weakness] : muscle weakness [Negative] : Neurological [FreeTextEntry4] : Nostril dry after last bout of Covud [FreeTextEntry9] : better, YONI aguirre see HPI

## 2023-09-11 ENCOUNTER — RX RENEWAL (OUTPATIENT)
Age: 68
End: 2023-09-11

## 2023-10-01 PROBLEM — K52.9 ENTERITIS: Status: RESOLVED | Noted: 2020-01-09 | Resolved: 2023-10-01

## 2023-11-03 ENCOUNTER — RESULT REVIEW (OUTPATIENT)
Age: 68
End: 2023-11-03

## 2023-11-10 ENCOUNTER — OFFICE (OUTPATIENT)
Dept: URBAN - METROPOLITAN AREA CLINIC 29 | Facility: CLINIC | Age: 68
Setting detail: OPHTHALMOLOGY
End: 2023-11-10
Payer: MEDICARE

## 2023-11-10 ENCOUNTER — APPOINTMENT (OUTPATIENT)
Dept: ENDOCRINOLOGY | Facility: CLINIC | Age: 68
End: 2023-11-10
Payer: MEDICARE

## 2023-11-10 VITALS
BODY MASS INDEX: 22.28 KG/M2 | WEIGHT: 118 LBS | HEIGHT: 61 IN | DIASTOLIC BLOOD PRESSURE: 70 MMHG | OXYGEN SATURATION: 98 % | HEART RATE: 84 BPM | SYSTOLIC BLOOD PRESSURE: 116 MMHG

## 2023-11-10 DIAGNOSIS — H11.31: ICD-10-CM

## 2023-11-10 DIAGNOSIS — Z79.899: ICD-10-CM

## 2023-11-10 DIAGNOSIS — H16.223: ICD-10-CM

## 2023-11-10 PROCEDURE — 92083 EXTENDED VISUAL FIELD XM: CPT | Performed by: OPHTHALMOLOGY

## 2023-11-10 PROCEDURE — 92134 CPTRZ OPH DX IMG PST SGM RTA: CPT | Performed by: OPHTHALMOLOGY

## 2023-11-10 PROCEDURE — 99215 OFFICE O/P EST HI 40 MIN: CPT

## 2023-11-10 PROCEDURE — 92014 COMPRE OPH EXAM EST PT 1/>: CPT | Performed by: OPHTHALMOLOGY

## 2023-11-10 ASSESSMENT — SPHEQUIV_DERIVED
OS_SPHEQUIV: 0
OD_SPHEQUIV: -1
OD_SPHEQUIV: -1
OD_SPHEQUIV: -0.25
OD_SPHEQUIV: -0.25
OS_SPHEQUIV: -1.125
OS_SPHEQUIV: -0.875
OS_SPHEQUIV: -1.125
OD_SPHEQUIV: -0.25

## 2023-11-10 ASSESSMENT — REFRACTION_MANIFEST
OS_AXIS: 105
OD_SPHERE: +1.25
OD_CYLINDER: +0.50
OS_CYLINDER: +0.75
OD_VA1: 20/30
OD_SPHERE: -1.25
OD_CYLINDER: +0.50
OS_VA1: 20/30
OS_SPHERE: +1.25
OS_AXIS: 105
OS_VA1: 20/20
OD_SPHERE: -0.50
OS_VA1: 20/25
OS_CYLINDER: +0.75
OS_SPHERE: -1.25
OS_SPHERE: -1.50
OD_CYLINDER: +0.50
OD_VA1: 20/20-2
OD_SPHERE: -0.50
OS_CYLINDER: +0.75
OD_AXIS: 050
OD_VA1: 20/30-2
OS_VA1: 20/50-
OS_SPHERE: -1.50
OS_SPHERE: -0.50
OD_VA1: 20/20
OD_CYLINDER: +0.50
OS_ADD: +2.50
OD_SPHERE: -1.25
OD_AXIS: 015
OD_AXIS: 50
OD_AXIS: 015
OD_ADD: +2.50
OS_AXIS: 105

## 2023-11-10 ASSESSMENT — REFRACTION_CURRENTRX
OS_SPHERE: +1.25
OS_SPHERE: +2.00
OD_OVR_VA: 20/
OD_SPHERE: +1.25
OS_SPHERE: +1.25
OD_SPHERE: +1.25
OD_SPHERE: +1.50
OD_OVR_VA: 20/
OS_OVR_VA: 20/
OS_OVR_VA: 20/
OD_OVR_VA: 20/
OS_OVR_VA: 20/

## 2023-11-10 ASSESSMENT — SUPERFICIAL PUNCTATE KERATITIS (SPK)
OD_SPK: T 1+
OS_SPK: T 1+

## 2023-11-10 ASSESSMENT — REFRACTION_AUTOREFRACTION
OD_AXIS: 035
OS_SPHERE: -0.25
OD_SPHERE: -0.50
OD_CYLINDER: +0.50
OS_CYLINDER: +0.50
OS_AXIS: 098

## 2023-11-10 ASSESSMENT — CONFRONTATIONAL VISUAL FIELD TEST (CVF)
OD_FINDINGS: FULL
OS_FINDINGS: FULL

## 2023-11-28 ENCOUNTER — APPOINTMENT (OUTPATIENT)
Dept: OBGYN | Facility: CLINIC | Age: 68
End: 2023-11-28
Payer: MEDICARE

## 2023-11-28 VITALS
HEIGHT: 61 IN | DIASTOLIC BLOOD PRESSURE: 70 MMHG | WEIGHT: 119 LBS | SYSTOLIC BLOOD PRESSURE: 112 MMHG | BODY MASS INDEX: 22.47 KG/M2

## 2023-11-28 PROCEDURE — 99202 OFFICE O/P NEW SF 15 MIN: CPT | Mod: 25

## 2023-11-28 PROCEDURE — 56605 BIOPSY OF VULVA/PERINEUM: CPT

## 2023-12-04 ENCOUNTER — APPOINTMENT (OUTPATIENT)
Dept: OBGYN | Facility: CLINIC | Age: 68
End: 2023-12-04
Payer: MEDICARE

## 2023-12-04 VITALS
DIASTOLIC BLOOD PRESSURE: 70 MMHG | SYSTOLIC BLOOD PRESSURE: 110 MMHG | WEIGHT: 119 LBS | HEIGHT: 61 IN | BODY MASS INDEX: 22.47 KG/M2

## 2023-12-04 PROCEDURE — 99213 OFFICE O/P EST LOW 20 MIN: CPT

## 2023-12-06 LAB — CORE LAB BIOPSY: NORMAL

## 2024-01-17 ENCOUNTER — RX RENEWAL (OUTPATIENT)
Age: 69
End: 2024-01-17

## 2024-01-18 ENCOUNTER — APPOINTMENT (OUTPATIENT)
Dept: RHEUMATOLOGY | Facility: CLINIC | Age: 69
End: 2024-01-18
Payer: MEDICARE

## 2024-01-18 VITALS
WEIGHT: 114 LBS | DIASTOLIC BLOOD PRESSURE: 64 MMHG | HEART RATE: 80 BPM | HEIGHT: 61 IN | SYSTOLIC BLOOD PRESSURE: 136 MMHG | OXYGEN SATURATION: 98 % | BODY MASS INDEX: 21.52 KG/M2

## 2024-01-18 DIAGNOSIS — M45.2 ANKYLOSING SPONDYLITIS OF CERVICAL REGION: ICD-10-CM

## 2024-01-18 PROCEDURE — 99214 OFFICE O/P EST MOD 30 MIN: CPT

## 2024-01-29 ENCOUNTER — TRANSCRIPTION ENCOUNTER (OUTPATIENT)
Age: 69
End: 2024-01-29

## 2024-02-21 ENCOUNTER — NON-APPOINTMENT (OUTPATIENT)
Age: 69
End: 2024-02-21

## 2024-02-21 ENCOUNTER — OFFICE (OUTPATIENT)
Dept: URBAN - METROPOLITAN AREA CLINIC 29 | Facility: CLINIC | Age: 69
Setting detail: OPHTHALMOLOGY
End: 2024-02-21
Payer: MEDICARE

## 2024-02-21 DIAGNOSIS — A63.0 ANOGENITAL (VENEREAL) WARTS: ICD-10-CM

## 2024-02-21 DIAGNOSIS — H02.831: ICD-10-CM

## 2024-02-21 DIAGNOSIS — M20.001 UNSPECIFIED DEFORMITY OF RIGHT FINGER(S): ICD-10-CM

## 2024-02-21 DIAGNOSIS — H16.223: ICD-10-CM

## 2024-02-21 DIAGNOSIS — H02.834: ICD-10-CM

## 2024-02-21 PROCEDURE — 92012 INTRM OPH EXAM EST PATIENT: CPT | Performed by: OPHTHALMOLOGY

## 2024-02-21 ASSESSMENT — REFRACTION_CURRENTRX
OD_OVR_VA: 20/
OS_OVR_VA: 20/
OD_OVR_VA: 20/
OD_SPHERE: +1.50
OS_OVR_VA: 20/
OS_SPHERE: +1.25
OD_OVR_VA: 20/
OS_SPHERE: +2.00
OD_SPHERE: +1.25
OS_OVR_VA: 20/
OD_SPHERE: +1.25
OS_SPHERE: +1.25

## 2024-02-21 ASSESSMENT — REFRACTION_MANIFEST
OS_CYLINDER: +0.75
OD_SPHERE: -1.25
OS_ADD: +2.50
OS_VA1: 20/30
OS_CYLINDER: +0.75
OD_SPHERE: -1.25
OS_AXIS: 105
OS_VA1: 20/25
OS_AXIS: 105
OS_SPHERE: -1.50
OS_VA1: 20/20
OD_AXIS: 050
OD_CYLINDER: +0.50
OS_SPHERE: -0.50
OD_VA1: 20/30-2
OD_SPHERE: +1.25
OD_ADD: +2.50
OD_SPHERE: -0.50
OD_SPHERE: -0.50
OS_VA1: 20/50-
OD_AXIS: 50
OD_AXIS: 015
OD_CYLINDER: +0.50
OD_AXIS: 015
OS_AXIS: 105
OD_VA1: 20/20-2
OD_CYLINDER: +0.50
OS_SPHERE: -1.25
OD_VA1: 20/20
OS_CYLINDER: +0.75
OD_VA1: 20/30
OS_SPHERE: +1.25
OS_SPHERE: -1.50
OD_CYLINDER: +0.50

## 2024-02-21 ASSESSMENT — REFRACTION_AUTOREFRACTION
OD_SPHERE: -0.50
OS_SPHERE: -0.25
OS_CYLINDER: +0.50
OD_AXIS: 035
OD_CYLINDER: +0.50
OS_AXIS: 098

## 2024-02-21 ASSESSMENT — SPHEQUIV_DERIVED
OD_SPHEQUIV: -0.25
OS_SPHEQUIV: 0
OS_SPHEQUIV: -1.125
OD_SPHEQUIV: -0.25
OD_SPHEQUIV: -0.25
OD_SPHEQUIV: -1
OS_SPHEQUIV: -1.125
OS_SPHEQUIV: -0.875
OD_SPHEQUIV: -1

## 2024-02-21 ASSESSMENT — CONFRONTATIONAL VISUAL FIELD TEST (CVF)
OS_FINDINGS: FULL
OD_FINDINGS: FULL

## 2024-02-21 ASSESSMENT — LID POSITION - COMMENTS
OD_COMMENTS: JOWLS
OS_COMMENTS: JOWLS

## 2024-02-21 ASSESSMENT — SUPERFICIAL PUNCTATE KERATITIS (SPK)
OD_SPK: T 1+
OS_SPK: T 1+

## 2024-02-21 ASSESSMENT — LID POSITION - DERMATOCHALASIS
OD_DERMATOCHALASIS: RUL T 1+
OS_DERMATOCHALASIS: LUL 3+

## 2024-02-22 ENCOUNTER — APPOINTMENT (OUTPATIENT)
Dept: CARDIOLOGY | Facility: CLINIC | Age: 69
End: 2024-02-22
Payer: MEDICARE

## 2024-02-22 ENCOUNTER — NON-APPOINTMENT (OUTPATIENT)
Age: 69
End: 2024-02-22

## 2024-02-22 VITALS
WEIGHT: 113 LBS | DIASTOLIC BLOOD PRESSURE: 82 MMHG | BODY MASS INDEX: 21.34 KG/M2 | SYSTOLIC BLOOD PRESSURE: 138 MMHG | HEIGHT: 61 IN

## 2024-02-22 DIAGNOSIS — Z87.898 PERSONAL HISTORY OF OTHER SPECIFIED CONDITIONS: ICD-10-CM

## 2024-02-22 DIAGNOSIS — E78.00 PURE HYPERCHOLESTEROLEMIA, UNSPECIFIED: ICD-10-CM

## 2024-02-22 DIAGNOSIS — F17.200 NICOTINE DEPENDENCE, UNSPECIFIED, UNCOMPLICATED: ICD-10-CM

## 2024-02-22 PROCEDURE — 99214 OFFICE O/P EST MOD 30 MIN: CPT | Mod: 25

## 2024-02-22 PROCEDURE — 99406 BEHAV CHNG SMOKING 3-10 MIN: CPT

## 2024-02-22 PROCEDURE — 93000 ELECTROCARDIOGRAM COMPLETE: CPT

## 2024-02-22 RX ORDER — MUPIROCIN 20 MG/G
2 OINTMENT TOPICAL
Qty: 22 | Refills: 0 | Status: DISCONTINUED | COMMUNITY
Start: 2019-11-20 | End: 2024-02-22

## 2024-02-22 RX ORDER — ALBUTEROL SULFATE 90 UG/1
108 (90 BASE) INHALANT RESPIRATORY (INHALATION)
Qty: 1 | Refills: 1 | Status: DISCONTINUED | COMMUNITY
Start: 2023-07-11 | End: 2024-02-22

## 2024-02-22 RX ORDER — CHOLECALCIFEROL (VITAMIN D3) 1250 MCG
1.25 MG CAPSULE ORAL
Refills: 0 | Status: ACTIVE | COMMUNITY

## 2024-02-22 RX ORDER — DIAZEPAM 10 MG/1
10 TABLET ORAL
Qty: 10 | Refills: 0 | Status: DISCONTINUED | COMMUNITY
Start: 2023-06-01 | End: 2024-02-22

## 2024-02-22 NOTE — CARDIOLOGY SUMMARY
[de-identified] : 2/22/24-nsr [de-identified] : adelfo 2/2023 nsr, 3 beat nsvt, [de-identified] : 3/17/23-ef 65%, mild tr [de-identified] : 2/24/23-calcium score 0

## 2024-02-22 NOTE — HISTORY OF PRESENT ILLNESS
[FreeTextEntry1] : Ms. Blount was  referred by Dr Wadsworth for dyspnea  and palpitations (2/2023) She noticed this when she was first diagnosed with RA, She exercise 4-5 times a week cardio/walking, without difficulty. She had oral surgery 6 week ago. She denies chest pain, no syncope.  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 she smokes 2 cigarettes  nightly.

## 2024-03-01 ENCOUNTER — APPOINTMENT (OUTPATIENT)
Dept: ENDOCRINOLOGY | Facility: CLINIC | Age: 69
End: 2024-03-01

## 2024-03-01 ENCOUNTER — RESULT REVIEW (OUTPATIENT)
Age: 69
End: 2024-03-01

## 2024-03-03 ENCOUNTER — RX RENEWAL (OUTPATIENT)
Age: 69
End: 2024-03-03

## 2024-03-07 ENCOUNTER — OFFICE (OUTPATIENT)
Dept: URBAN - METROPOLITAN AREA CLINIC 29 | Facility: CLINIC | Age: 69
Setting detail: OPHTHALMOLOGY
End: 2024-03-07
Payer: MEDICARE

## 2024-03-07 DIAGNOSIS — H16.223: ICD-10-CM

## 2024-03-07 DIAGNOSIS — H02.831: ICD-10-CM

## 2024-03-07 DIAGNOSIS — H02.834: ICD-10-CM

## 2024-03-07 DIAGNOSIS — H02.413: ICD-10-CM

## 2024-03-07 DIAGNOSIS — H53.422: ICD-10-CM

## 2024-03-07 PROCEDURE — 92083 EXTENDED VISUAL FIELD XM: CPT | Performed by: OPHTHALMOLOGY

## 2024-03-07 PROCEDURE — 99213 OFFICE O/P EST LOW 20 MIN: CPT | Performed by: OPHTHALMOLOGY

## 2024-03-07 ASSESSMENT — REFRACTION_MANIFEST
OS_CYLINDER: +0.75
OD_CYLINDER: +0.50
OD_CYLINDER: +0.50
OS_CYLINDER: +0.75
OD_ADD: +2.50
OS_VA1: 20/20
OD_SPHERE: -1.25
OD_SPHERE: -0.50
OS_SPHERE: -0.50
OS_VA1: 20/25
OS_AXIS: 105
OD_VA1: 20/20
OD_AXIS: 015
OD_AXIS: 050
OD_AXIS: 50
OS_SPHERE: -1.50
OS_CYLINDER: +0.75
OD_SPHERE: -0.50
OS_SPHERE: -1.50
OS_VA1: 20/30
OS_VA1: 20/50-
OS_AXIS: 105
OD_CYLINDER: +0.50
OS_SPHERE: +1.25
OD_SPHERE: -1.25
OD_VA1: 20/20-2
OS_AXIS: 105
OS_SPHERE: -1.25
OS_ADD: +2.50
OD_VA1: 20/30-2
OD_CYLINDER: +0.50
OD_AXIS: 015
OD_VA1: 20/30
OD_SPHERE: +1.25

## 2024-03-07 ASSESSMENT — REFRACTION_CURRENTRX
OD_SPHERE: +1.50
OS_SPHERE: +1.25
OS_OVR_VA: 20/
OS_SPHERE: +1.25
OS_OVR_VA: 20/
OS_OVR_VA: 20/
OD_OVR_VA: 20/
OS_SPHERE: +2.00
OD_SPHERE: +1.25
OD_SPHERE: +1.25

## 2024-03-07 ASSESSMENT — LID POSITION - PTOSIS
OD_PTOSIS: RUL 1+
OS_PTOSIS: LUL 1+

## 2024-03-07 ASSESSMENT — SPHEQUIV_DERIVED
OD_SPHEQUIV: -1
OD_SPHEQUIV: -0.25
OS_SPHEQUIV: -1.125
OS_SPHEQUIV: -0.875
OD_SPHEQUIV: -0.25
OD_SPHEQUIV: -1
OS_SPHEQUIV: -1.125

## 2024-03-07 ASSESSMENT — LID POSITION - DERMATOCHALASIS
OD_DERMATOCHALASIS: RUL T
OS_DERMATOCHALASIS: LUL 3+

## 2024-03-07 ASSESSMENT — LID POSITION - COMMENTS
OS_COMMENTS: JOWLS
OD_COMMENTS: JOWLS

## 2024-03-08 ENCOUNTER — APPOINTMENT (OUTPATIENT)
Dept: ENDOCRINOLOGY | Facility: CLINIC | Age: 69
End: 2024-03-08

## 2024-03-08 ENCOUNTER — APPOINTMENT (OUTPATIENT)
Dept: ENDOCRINOLOGY | Facility: CLINIC | Age: 69
End: 2024-03-08
Payer: MEDICARE

## 2024-03-08 DIAGNOSIS — M81.8 OTHER OSTEOPOROSIS W/OUT CURRENT PATHOLOGICAL FRACTURE: ICD-10-CM

## 2024-03-08 PROCEDURE — G2211 COMPLEX E/M VISIT ADD ON: CPT

## 2024-03-08 PROCEDURE — 99215 OFFICE O/P EST HI 40 MIN: CPT

## 2024-03-08 NOTE — HISTORY OF PRESENT ILLNESS
[Home] : at home, [unfilled] , at the time of the visit. [Verbal consent obtained from patient] : the patient, [unfilled] [Medical Office: ([facility])___] : at the medical office located in  [FreeTextEntry1] : Mar 08, 2024     vid chat  PCP:  Dr. Shannon Wadsworth          Rheum:  Dr. Drew Wood        on tapering prednison, down to 2.5 mg                                           Oral Surgeon:  Dr. Ackerman   Dental implants 1 going.             Eyes:  Dr.James Lao in [place] for cataracts   CC:  Osteoporosis    7/15/21  T scores:  LS -3.2;  FN -2.4                                   7/20/23  T scores:   LS -1.6;  FN  -1.9;  TH -0.9                             11/2021 Tymlos #1 -> elev. Ca++  -> Evenity x 12 finished prior to oral surgery ~12/2023 -> Tymlos  b/o the oral surgery           12/2/2021 X-ray spine:  no compressions; mild exaggeration of upper          kyphosis            (RA)  & fatigue          (cataract            3/2024   creat 1.2    67 yo with RA (on Actemra, hydroxychloroquine, prednisone 2 mg/d[down from 20]) , visits for osteoporosis -  In July 2021, spine T -3.2;  started on Tymlos 11/2021  but developed hypercalcemia and Tymlos discontinued Feb 2022.   Now on Evenity, just completed Evenity #12. Went for bone density 7/20/23 and spine T -1.6  .-  - Originally treated with Tymlos, developed hypercalcemia -  so took Evenity for a year and plan was to trnsition to Prolia, but this was not favored by her oral surgeon and whe restarted the Tymlos (originally stopped because of hypercalcemia) and went for recent lab tests.   calcium 9.3   and 3/1/2021 calcium 9.8  vit D 47, 1, 25 di OH vit D also WNL  Takes calcium with D (she will let me know what type   She had another extratctions and anticipates 4 implants - Had first part of implants in January and will return and will return June 13  Dr. Ackerman is her oral surgeon.   She was told she can go on Prolia after 6 weeks. Our next visist is end of July 26.    By then she will be on [employer] PPO   Impression:  Bone density has responded well to Rx.    Also on Actemra.      Nov 10, 2023    in person  PCP:  Dr. Shannon Wadsworth          Rheum:  Dr. Drew Wood        on tapering prednison, down to 2.5 mg                                           Oral Surgeon:  Dr. Ackerman   Dental implants 1 going.             Eyes:  Dr.James Lao in [place] for cataracts   CC:  Osteoporosis    7/15/21  T scores:  LS -3.2;  FN -2.4                                   7/20/23  T scores:   LS -1.6;  FN  -1.9;  TH -0.9             12/2/2021 X-ray spine:  no compressions; mild exaggeration of upper          kyphosis            (RA)  & fatigue          (cataract  67 yo with RA (on Actemra, hydroxychloroquine, prednisone 2 mg/d[down from 20]) , visits for osteoporosis -  In July 2021, spine T -3.2;  started on Tymlos 11/2021  but developed hypercalcemia and Tymlos discontinued Feb 2022.   Now on Evenity, just completed Evenity #12. Went for bone density 7/20/23 and spine T -1.6  .-  - Originally treaated with Tymlos, developed hypercalcemia -  so took Evenity for a year and plan was to trnsition to Prolia, but this was not favored by her oral surgeon and whe restarted the Tymlos (originally stopped because of hypercalcemia) and went for recent lab tests.   calcium 9.3     : : Constitutional:  Alert, well nourished, healthy appearance, no acute distress  Eyes:  No proptosis, no stare Thyroid: Pulmonary:  No respiratory distress, no accessory muscle use; normal rate and effort Cardiac:  Normal rate Vascular:  Endocrine:  No stigmata of Cushings Syndrome Musculoskeletal:  Normal gait, no involuntary movements Neurology:  No tremors Affect/Mood/Psych:  Oriented x 3; normal affect, normal insight/judgement, normal mood  . Impression:  Hypercalcemia has not returned on this reinitiation of Tymlos  (originally took Tymlos 2-3 times)   Going for implant surgery in January - 2024   Then will have a temporary bridge.  Plan:  Continue Tymlos for now with monitoring of calcium.   Labs in March 2024 prior to visit..  ROV March Jul 27, 2023       nip  PCP:  Dr. Shannon Wadsworth          Rheum:  Dr. Drew Wood        on tapering prednison, down to 2.5 mg                                           Oral Surgeon:  Dr. Ackerman   Dental implants 1 going.             Eyes:  Dr.James Lao in [place] for cataracts   CC:  Osteoporosis    7/15/21  T scores:  LS -3.2;  FN -2.4                                   7/20/23  T scores:   LS -1.6;  FN  -1.9;  TH -0.9             12/2/2021 X-ray spine:  no compressions; mild exaggeration of upper          kyphosis            (RA)  & fatigue          (cataract  66 yo with RA, visits for osteoporosis -  In July 2021, spine T -3.2;  started on Tymlos 11/2021  but developed hypercalcemia and Tymlos discontinued Feb 2022.   Now on Evenity, just completed Evenity #12. Went for bone density 7/20/23 and spine T -1.6  .-   Imp:  Dramatic improvement in spine T score.  She will have X-ray spine to confirm this does not reflect compressions. Her oral surgeon will not allow her to transition to an antiresorptive, so she will return ) to (the lesser Tymlos    May 12, 2023    in person  PCP:  Dr. Shannon Wadsworth          Rheum:  Dr. Drew Wood        on tapering prednison, down to 2.5 mg                                           Oral Surgeon:  Dr. Ackerman   Dental implants 1 going.             Eyes:  Dr.James Lao in [place] for cataracts   CC:  Osteoporosis    7/15/21  T scores:  LS -3.2;  FN -2.4           12/2/2021 X-ray spine:  no compressions; mild exaggeration of upper          kyphosis            (RA)  & fatigue          (cataract  66 yo with RA, visits for osteoporosis -  In July 2021, spine T -3.2;  started on Tymlos 11/2021  but developed hypercalcemia and Tymlos discontinued Feb 2022.   Now on Evenity.- will receive #11 next week.      For RA, now on 2 mg prednisone. End of Jan 2023  had Evenity #7    vit D 40   2/24/23  will be Evenity * 8,  March 9 and June 12 so July switch to Prolia    Impression:  Tolerating the Evenity without problem.  Spine T score trajectory is of improvement.  She will be going for a follow up bone density by July.  She should also be starting Prolia every 7 months by July (after Evenity #12) Plan:  She will discuss dental issues with her oral surgeon.      Feb 16, 2023      iPhone      PCP:  Dr. Shannon Wadsworth          Rheum:  Dr. Drew Wood        on tapering prednison, down to 2.5 mg                                           Oral Surgeon:  Dr. Ackerman   Dental implants 1 going.             Eyes:  Dr.James Lao in [place] for cataracts   CC:  Osteoporosis    7/15/21  T scores:  LS -3.2;  FN -2.4           12/2/2021 X-ray spine:  no compressions; mild exaggeration of upper          kyphosis            (RA)  & fatigue          (cataract  66 yo with RA, visits for osteoporosis -  In July 2021, spine T -3.2;  started on Tymlos 11/2021  but developed hypercalcemia and Tymlos discontinued Feb 2022.   Now on Evenity.  For RA, now on 2 mg prednisone. End of Jan 2023  had Evenity #7    vit D 40   2/24/23  will be Evenity * 8,  March 9 and June 12 so July switch to Prolia    Impression:  Tolerating the Evenity without problem.  Spine T score trajectory is of improvement.    Plan:  Will aim to transition to Prolia by July.     Nov 11, 2022    iPhone  PCP:  Dr. Shannon Wadsworth          Rheum:  Dr. Drew Wood        on tapering prednison, down to 2.5 mg                                           Oral Surgeon:  Dr. Ackerman   Dental implants 1 going.             Eyes:  Dr.James Lao in [place] for cataracts   CC:  Osteoporosis    7/15/21  T scores:  LS -3.2;  FN -2.4       12/2/2021 X-ray spine:  no compressions; mild exaggeration of upper          kyphosis            (RA)  & fatigue          (cataract  Recently went for Evenity #4    No problem               last to be August 2023     Could not see ortho because of pelvic pain.      Imp:  Doing well on Evenity.   Will be going for cataract surgery    She will let her oral surgeon know that after 12 months of Evenity, she will transition to Prolia     Jul 22, 2022        Telephonic  PCP:  Dr. Shannon Wadsworth          Rheum:  Dr. Drew Wood                                               Oral Surgeon:  Dr. Ackerman   Dental implants 1 going on 2.   CC:  Osteoporosis    7/15/21  T scores:  LS -3.2;  FN -2.4       12/2/2021 X-ray spine:  no compressions; mild exaggeration of upper          kyphosis            (RA)    Because of spine T -3.2,and because of dental procedures:  two implants -  given Rx for Tymlos but calcium went to 10.7 and osteocalcin went from 10 to 209 so Tymlos stopped.   f/u BD reviewed by me today and she wishes not to hear result.   Transitioning to Evenity and she has been told of PA requirement.  Imp:  Severe osteoporosis of spine with kyphosis. Did not tolerate Tymlos.  Plan:  Evenity requiested.      Mar 14, 2022         Dr. Hussein Ackerman  p    fax   PCP:  Dr. Shannon Wadsworth          Rheum:  Dr. Drew Wood                                               Oral Surgeon:  Dr. Ackerman   Dental implants 1 going on 2.   CC:  Osteoporosis    7/15/21  T scores:  LS -3.2;  FN -2.4       12/2/2021 X-ray spine:  no compressions; mild exaggeration of upper          kyphosis            (RA)    67 yo with spine T -3.2, low bone turnover markers.   Long history of dental issues. First implant 15 years ago  - only two implants "took" and two didn't take. Most recent implant finished up last week (takes 9 months) - She will aim for crown in August - 6 months after completed.    It is like a molar on the right. She has been told by oral surgeon that she is not going to to need further work on teeth in the near future. Started on Tymlos; however, developed elevated calcium with dramatic increase in markers of bone turnover and Tymlos discontinued.   Those chemical changes   Impression:  Osteoporosis of spine;  dental issues winding down.  Plan:  Will inquire of insurnce plan if Evenity can be covered.  -     Feb 26, 2022  PCP:  Dr. Shannon Wadsworth          Rheum:  Dr. Drew Wood                                               Oral Surgeon:  Dr. Ackerman   Dental implants 1 going on 2.   CC:  Osteoporosis    7/15/21  T scores:  LS -3.2;  FN -2.4       12/2/2021 X-ray spine:  no compressions; mild exaggeration of upper          kyphosis            (RA)    Dropped off urine today Because of hypercalcemia, decreased intake to 600 mg/dl Off of the Tymlos  after elevation of calcium, alk phos, BS BS, calcium, alk phos, all returned to normal.  For RA, she has been treated with Actemra, chloroquine, prednisone.     Had shingles.  She has dental iimplant work in progress.  Imp:  Appears to have had marked boneover response based on measurments of OC, CTXs.  Plan:  Stay of of PTH analogues.  She will discuss next steps with her oral surgeon and dentist such as the proposal for Evenity x 12 months followed by Prolia.      Feb 03, 2022     in person  PCP:  Dr. Shannon Wadsworth          Rheum:  Dr. Drew Wood                                               Oral Surgeon:  Dr. Ackerman   Dental implants 1 going on 2.   CC:  Osteoporosis    7/15/21  T scores:  LS -3.2;  FN -2.4       12/2/2021 X-ray spine:  no compressions; mild exaggeration of upper          kyphosis            (RA)    Now taking Tymlos since Nov 10     By Nov 27 alk phos went from 40 to 191, but by Jan 31 alk phos down to 129   Creatinine went up to 1.34 from 1.1 and calcium up to 10.7 from 10.4  She also notes constipation.  Impression:  Not clear if metabolic changes are a side effect of the Tymlos.     Plan:  Put Tymlos on "hold" Decrease calcium tabs by 1/2 to 600 mg/dl Updated labs today, call for results in a few days  and then f/u labs/visit  again in 4 weeks.     She will also see her PCP as well.     Nov 09, 2021    in person  PCP:  Dr. Shannon Wadsworth          Rheum:  Dr. Drew Wood                                               Oral Surgeon:  Dr. 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  Started on prednisone about 2 1/2 years  - about 20 mg/d and now 2.5 mg   Spine T -3.2.   Has not yet had a chance to go for X-ray spine, but has appointment for early December. Also in process of dental implant. CTXs.  154  Impression:  X-ray spine will be helpful. Appropriate candidate for anabolic  Plan: Instructed in use of Tymlos and she did well.  She will call me in a few days to let me how she is doing and she will go for updated labs before the end of the month.      Oct 22, 2021  PCP:  Dr. Shannon Wadsworth          Rheum:  Dr. Drew Wood                                               Oral Surgeon:  Dr. 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  Started on prednisone about 2 1/2 years  - about 20 mg/d and now 2.5 mg and is now able to turn her neck much better. Also receives weekly Actemra, Plaquinil,.     Started on Fosamax 3/2018  - and stopped about a year ago when she went for a dental implant (has Hx of multiple failed implants)                     Oral surgeon   [place]   - going for another implant in 2 weeks.     Recent bone density at [facility] on 7/15/21  Lumbar spine, L1 to L4, total: 0.695 gm./cm.2.  T-score: -3.2  Z-score: -1.5  Left hip, femoral neck: 0.583 gm./cm.2.  T-score: -2.4  Z-score: 0.9  Impression:  the recent spine T score of -3.2 indicates that the Fosamax treatment has worn off and she is in need of additional pharmacologic  intervention     She can not take an antiresorptive at this time because of the multiple previous failed dental implants and the need for another dental implant in the future.   Population studies indicate that the spine bone strength would be far better optimized by treating with an anabolic agent at this time, such as Tymlos, Forteo or generic teraparatide for 18-24 months and this would be acceptable to the oral surgeon as well.    Plan:  Updated lab tests.  In May the vitamin D level was sufficient at 48, thyroid function tests were within normal limits and serum calcium was within normal limits at 9.8.   A request to her pharmacy will be sent for Tymlos and a prior authoirzation will be completed if indicated. She will call me about 10 days after labs have been drawn.

## 2024-03-11 ENCOUNTER — APPOINTMENT (OUTPATIENT)
Dept: INTERNAL MEDICINE | Facility: CLINIC | Age: 69
End: 2024-03-11
Payer: MEDICARE

## 2024-03-11 VITALS
OXYGEN SATURATION: 99 % | HEART RATE: 86 BPM | HEIGHT: 61 IN | BODY MASS INDEX: 21.34 KG/M2 | SYSTOLIC BLOOD PRESSURE: 112 MMHG | WEIGHT: 113 LBS | DIASTOLIC BLOOD PRESSURE: 88 MMHG | TEMPERATURE: 97 F

## 2024-03-11 DIAGNOSIS — R09.81 NASAL CONGESTION: ICD-10-CM

## 2024-03-11 DIAGNOSIS — I10 ESSENTIAL (PRIMARY) HYPERTENSION: ICD-10-CM

## 2024-03-11 DIAGNOSIS — R53.83 OTHER FATIGUE: ICD-10-CM

## 2024-03-11 DIAGNOSIS — M06.09 RHEUMATOID ARTHRITIS W/OUT RHEUMATOID FACTOR, MULTIPLE SITES: ICD-10-CM

## 2024-03-11 PROCEDURE — G2211 COMPLEX E/M VISIT ADD ON: CPT

## 2024-03-11 PROCEDURE — 99213 OFFICE O/P EST LOW 20 MIN: CPT

## 2024-03-26 ENCOUNTER — TRANSCRIPTION ENCOUNTER (OUTPATIENT)
Age: 69
End: 2024-03-26

## 2024-04-07 ENCOUNTER — TRANSCRIPTION ENCOUNTER (OUTPATIENT)
Age: 69
End: 2024-04-07

## 2024-04-09 ENCOUNTER — TRANSCRIPTION ENCOUNTER (OUTPATIENT)
Age: 69
End: 2024-04-09

## 2024-04-10 ENCOUNTER — TRANSCRIPTION ENCOUNTER (OUTPATIENT)
Age: 69
End: 2024-04-10

## 2024-04-10 DIAGNOSIS — M17.10 UNILATERAL PRIMARY OSTEOARTHRITIS, UNSPECIFIED KNEE: ICD-10-CM

## 2024-04-12 ENCOUNTER — RX RENEWAL (OUTPATIENT)
Age: 69
End: 2024-04-12

## 2024-04-12 RX ORDER — LOSARTAN POTASSIUM 50 MG/1
50 TABLET, FILM COATED ORAL
Qty: 90 | Refills: 0 | Status: ACTIVE | COMMUNITY
Start: 2020-11-17 | End: 1900-01-01

## 2024-05-02 ENCOUNTER — OFFICE (OUTPATIENT)
Dept: URBAN - METROPOLITAN AREA CLINIC 29 | Facility: CLINIC | Age: 69
Setting detail: OPHTHALMOLOGY
End: 2024-05-02
Payer: MEDICARE

## 2024-05-02 PROCEDURE — THREAD LIF: Performed by: OPHTHALMOLOGY

## 2024-05-02 ASSESSMENT — LID POSITION - PTOSIS
OS_PTOSIS: LUL 1+
OD_PTOSIS: RUL 1+

## 2024-05-02 ASSESSMENT — CONFRONTATIONAL VISUAL FIELD TEST (CVF)
OS_FINDINGS: FULL
OD_FINDINGS: FULL

## 2024-05-02 ASSESSMENT — LID POSITION - COMMENTS
OS_COMMENTS: JOWLS
OD_COMMENTS: JOWLS

## 2024-05-02 ASSESSMENT — LID POSITION - DERMATOCHALASIS
OD_DERMATOCHALASIS: RUL T
OS_DERMATOCHALASIS: LUL 3+

## 2024-05-09 RX ORDER — HYDROXYCHLOROQUINE SULFATE 200 MG/1
200 TABLET, FILM COATED ORAL
Qty: 180 | Refills: 1 | Status: ACTIVE | COMMUNITY
Start: 2019-02-06 | End: 1900-01-01

## 2024-05-09 RX ORDER — PREDNISONE 1 MG/1
1 TABLET ORAL
Qty: 180 | Refills: 1 | Status: ACTIVE | COMMUNITY
Start: 2019-01-17 | End: 1900-01-01

## 2024-05-10 ENCOUNTER — OFFICE (OUTPATIENT)
Dept: URBAN - METROPOLITAN AREA CLINIC 29 | Facility: CLINIC | Age: 69
Setting detail: OPHTHALMOLOGY
End: 2024-05-10
Payer: MEDICARE

## 2024-05-10 DIAGNOSIS — H02.831: ICD-10-CM

## 2024-05-10 DIAGNOSIS — Z79.899: ICD-10-CM

## 2024-05-10 DIAGNOSIS — H02.834: ICD-10-CM

## 2024-05-10 PROBLEM — H02.413 PTOSIS, MECHANICAL; BOTH EYES: Status: ACTIVE | Noted: 2024-05-10

## 2024-05-10 PROCEDURE — 92012 INTRM OPH EXAM EST PATIENT: CPT | Performed by: OPHTHALMOLOGY

## 2024-05-10 PROCEDURE — 92083 EXTENDED VISUAL FIELD XM: CPT | Performed by: OPHTHALMOLOGY

## 2024-05-10 PROCEDURE — 92134 CPTRZ OPH DX IMG PST SGM RTA: CPT | Performed by: OPHTHALMOLOGY

## 2024-05-10 ASSESSMENT — LID POSITION - PTOSIS
OS_PTOSIS: LUL 1+
OD_PTOSIS: RUL 1+

## 2024-05-10 ASSESSMENT — CONFRONTATIONAL VISUAL FIELD TEST (CVF)
OS_FINDINGS: FULL
OD_FINDINGS: FULL

## 2024-05-10 ASSESSMENT — LID POSITION - DERMATOCHALASIS
OD_DERMATOCHALASIS: RUL T
OS_DERMATOCHALASIS: LUL 3+

## 2024-05-14 RX ORDER — TOCILIZUMAB 180 MG/ML
162 INJECTION, SOLUTION SUBCUTANEOUS
Qty: 4 | Refills: 5 | Status: ACTIVE | COMMUNITY
Start: 2021-03-25 | End: 1900-01-01

## 2024-05-29 ENCOUNTER — RX RENEWAL (OUTPATIENT)
Age: 69
End: 2024-05-29

## 2024-05-29 RX ORDER — ABALOPARATIDE 2000 UG/ML
3120 INJECTION, SOLUTION SUBCUTANEOUS DAILY
Qty: 2 | Refills: 3 | Status: ACTIVE | COMMUNITY
Start: 2023-06-16 | End: 1900-01-01

## 2024-06-06 ENCOUNTER — APPOINTMENT (OUTPATIENT)
Dept: INTERNAL MEDICINE | Facility: CLINIC | Age: 69
End: 2024-06-06

## 2024-06-12 RX ORDER — PEN NEEDLE, DIABETIC 29 G X1/2"
31G X 5 MM NEEDLE, DISPOSABLE MISCELLANEOUS
Qty: 100 | Refills: 3 | Status: ACTIVE | COMMUNITY
Start: 2023-12-05 | End: 1900-01-01

## 2024-06-28 ENCOUNTER — OFFICE (OUTPATIENT)
Dept: URBAN - METROPOLITAN AREA CLINIC 29 | Facility: CLINIC | Age: 69
Setting detail: OPHTHALMOLOGY
End: 2024-06-28
Payer: COMMERCIAL

## 2024-06-28 PROCEDURE — 99024 POSTOP FOLLOW-UP VISIT: CPT | Performed by: OPHTHALMOLOGY

## 2024-06-28 ASSESSMENT — LID POSITION - DERMATOCHALASIS
OS_DERMATOCHALASIS: LUL 3+
OD_DERMATOCHALASIS: RUL T

## 2024-06-28 ASSESSMENT — LID POSITION - PTOSIS
OS_PTOSIS: LUL 1+
OD_PTOSIS: RUL 1+

## 2024-06-28 ASSESSMENT — CONFRONTATIONAL VISUAL FIELD TEST (CVF)
OS_FINDINGS: FULL
OD_FINDINGS: FULL

## 2024-07-25 ENCOUNTER — APPOINTMENT (OUTPATIENT)
Dept: RHEUMATOLOGY | Facility: CLINIC | Age: 69
End: 2024-07-25
Payer: MEDICAID

## 2024-07-25 VITALS
DIASTOLIC BLOOD PRESSURE: 70 MMHG | WEIGHT: 113 LBS | HEART RATE: 78 BPM | BODY MASS INDEX: 21.34 KG/M2 | SYSTOLIC BLOOD PRESSURE: 118 MMHG | HEIGHT: 61 IN | OXYGEN SATURATION: 100 %

## 2024-07-25 DIAGNOSIS — M06.09 RHEUMATOID ARTHRITIS W/OUT RHEUMATOID FACTOR, MULTIPLE SITES: ICD-10-CM

## 2024-07-25 PROCEDURE — G2211 COMPLEX E/M VISIT ADD ON: CPT | Mod: NC,1L

## 2024-07-25 PROCEDURE — 99204 OFFICE O/P NEW MOD 45 MIN: CPT

## 2024-07-25 NOTE — PHYSICAL EXAM
[General Appearance - Alert] : alert [General Appearance - In No Acute Distress] : in no acute distress [General Appearance - Well Nourished] : well nourished [General Appearance - Well Developed] : well developed [Sclera] : the sclera and conjunctiva were normal [] : no rash [Motor Exam] : the motor exam was normal [Musculoskeletal - Swelling] : no joint swelling seen [FreeTextEntry1] : There is improved C spine rotation. There is no active synovitis, including in the elbows.There is swelling of the extensor tendon sheath over the right wrist. There is no tenderness of MCP and PIP joints. 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

## 2024-07-25 NOTE — HISTORY OF PRESENT ILLNESS
[FreeTextEntry1] : Patient is taking her medications as prescribed. No flare of symptoms. Tried to reduce her dose of prednisone to 1.5 mg and went back to 2 mg daily.

## 2024-07-26 ENCOUNTER — APPOINTMENT (OUTPATIENT)
Dept: ENDOCRINOLOGY | Facility: CLINIC | Age: 69
End: 2024-07-26
Payer: MEDICARE

## 2024-07-26 DIAGNOSIS — M81.8 OTHER OSTEOPOROSIS W/OUT CURRENT PATHOLOGICAL FRACTURE: ICD-10-CM

## 2024-07-26 PROCEDURE — 99215 OFFICE O/P EST HI 40 MIN: CPT

## 2024-07-26 NOTE — HISTORY OF PRESENT ILLNESS
[FreeTextEntry1] : Jul 26, 2024       - Videochat using [employer]   PCP:  Dr. Shannon Wadsworth          Rheum:  Dr. Drew Wood        on tapering prednison, down to 2.5 mg                                           Oral Surgeon:  Dr. Ackerman   Dental implants 1 going.             Eyes:  Dr.James Lao in [place] for cataracts   CC:  Osteoporosis    7/15/21  T scores:  LS -3.2;  FN -2.4                                   7/20/23  T scores:   LS -1.6;  FN  -1.9;  TH -0.9                             11/2021 Tymlos #1 -> elev. Ca++  -> Evenity x 12 finished prior to oral surgery ~12/2023 -> Tymlos  b/o the oral surgery           12/2/2021 X-ray spine:  no compressions; mild exaggeration of upper          kyphosis            (RA)  & fatigue          (cataract            3/2024   creat 1.2    67 yo with RA (on Actemra, hydroxychloroquine, prednisone 2 mg/d[down from 20]) , visits for osteoporosis -  In July 2021, spine T -3.2;  started on Tymlos 11/2021  but developed hypercalcemia and Tymlos discontinued Feb 2022.   Now on Evenity, just completed Evenity #12. Went for bone density 7/20/23 and spine T -1.6  .-  - Originally treated with Tymlos, developed hypercalcemia -  so took Evenity for a year and plan was to trnsition to Prolia, but this was not favored by her oral surgeon and whe restarted the Tymlos (originally stopped because of hypercalcemia) and went for recent lab tests.   calcium 9.3   and 3/1/2021 calcium 9.8  vit D 47, 1, 25 di OH vit D also WNL  Now down to 2 mg prednisone daily, Plaquinil and Actemra. Anticipates she will complete dental work by September and will be ready to transition to Prolia. I will ask Jannie MARRERO to obtain PA for IC. She will see if she can get BMP and vitamin D level at office of  prior to the planned Prolia. She will keep Nov 15 2024 visit to me and also schedule for March in anticipation of Prolia #2.     Mar 08, 2024     vid chat  PCP:  Dr. Shannon Wadsworth          Rheum:  Dr. Drew Wood        on tapering prednison, down to 2.5 mg                                           Oral Surgeon:  Dr. Ackerman   Dental implants 1 going.             Eyes:  Dr.James Lao in [place] for cataracts   CC:  Osteoporosis    7/15/21  T scores:  LS -3.2;  FN -2.4                                   7/20/23  T scores:   LS -1.6;  FN  -1.9;  TH -0.9                             11/2021 Tymlos #1 -> elev. Ca++  -> Evenity x 12 finished prior to oral surgery ~12/2023 -> Tymlos  b/o the oral surgery           12/2/2021 X-ray spine:  no compressions; mild exaggeration of upper          kyphosis            (RA)  & fatigue          (cataract            3/2024   creat 1.2    67 yo with RA (on Actemra, hydroxychloroquine, prednisone 2 mg/d[down from 20]) , visits for osteoporosis -  In July 2021, spine T -3.2;  started on Tymlos 11/2021  but developed hypercalcemia and Tymlos discontinued Feb 2022.   Now on Evenity, just completed Evenity #12. Went for bone density 7/20/23 and spine T -1.6  .-  - Originally treated with Tymlos, developed hypercalcemia -  so took Evenity for a year and plan was to trnsition to Prolia, but this was not favored by her oral surgeon and whe restarted the Tymlos (originally stopped because of hypercalcemia) and went for recent lab tests.   calcium 9.3   and 3/1/2021 calcium 9.8  vit D 47, 1, 25 di OH vit D also WNL  Takes calcium with D (she will let me know what type   She had another extratctions and anticipates 4 implants - Had first part of implants in January and will return and will return June 13  Dr. Ackerman is her oral surgeon.   She was told she can go on Prolia after 6 weeks. Our next visist is end of July 26.    By then she will be on [employer] PPO   Impression:  Bone density has responded well to Rx.    Also on Actemra.      Nov 10, 2023    in person  PCP:  Dr. Shannon Wadsworth          Rheum:  Dr. Drew Wood        on tapering prednison, down to 2.5 mg                                           Oral Surgeon:  Dr. Ackerman   Dental implants 1 going.             Eyes:  Dr.James Lao in [place] for cataracts   CC:  Osteoporosis    7/15/21  T scores:  LS -3.2;  FN -2.4                                   7/20/23  T scores:   LS -1.6;  FN  -1.9;  TH -0.9             12/2/2021 X-ray spine:  no compressions; mild exaggeration of upper          kyphosis            (RA)  & fatigue          (cataract  67 yo with RA (on Actemra, hydroxychloroquine, prednisone 2 mg/d[down from 20]) , visits for osteoporosis -  In July 2021, spine T -3.2;  started on Tymlos 11/2021  but developed hypercalcemia and Tymlos discontinued Feb 2022.   Now on Evenity, just completed Evenity #12. Went for bone density 7/20/23 and spine T -1.6  .-  - Originally treaated with Tymlos, developed hypercalcemia -  so took Evenity for a year and plan was to trnsition to Prolia, but this was not favored by her oral surgeon and whe restarted the Tymlos (originally stopped because of hypercalcemia) and went for recent lab tests.   calcium 9.3     : : Constitutional:  Alert, well nourished, healthy appearance, no acute distress  Eyes:  No proptosis, no stare Thyroid: Pulmonary:  No respiratory distress, no accessory muscle use; normal rate and effort Cardiac:  Normal rate Vascular:  Endocrine:  No stigmata of Cushings Syndrome Musculoskeletal:  Normal gait, no involuntary movements Neurology:  No tremors Affect/Mood/Psych:  Oriented x 3; normal affect, normal insight/judgement, normal mood  . Impression:  Hypercalcemia has not returned on this reinitiation of Tymlos  (originally took Tymlos 2-3 times)   Going for implant surgery in January - 2024   Then will have a temporary bridge.  Plan:  Continue Tymlos for now with monitoring of calcium.   Labs in March 2024 prior to visit..  ROV March Jul 27, 2023       nip  PCP:  Dr. Shannon Wadsworth          Rheum:  Dr. Drew Wood        on tapering prednison, down to 2.5 mg                                           Oral Surgeon:  Dr. Ackerman   Dental implants 1 going.             Eyes:  Dr.James Lao in [place] for cataracts   CC:  Osteoporosis    7/15/21  T scores:  LS -3.2;  FN -2.4                                   7/20/23  T scores:   LS -1.6;  FN  -1.9;  TH -0.9             12/2/2021 X-ray spine:  no compressions; mild exaggeration of upper          kyphosis            (RA)  & fatigue          (cataract  66 yo with RA, visits for osteoporosis -  In July 2021, spine T -3.2;  started on Tymlos 11/2021  but developed hypercalcemia and Tymlos discontinued Feb 2022.   Now on Evenity, just completed Evenity #12. Went for bone density 7/20/23 and spine T -1.6  .-   Imp:  Dramatic improvement in spine T score.  She will have X-ray spine to confirm this does not reflect compressions. Her oral surgeon will not allow her to transition to an antiresorptive, so she will return ) to (the lesser Tymlos    May 12, 2023    in person  PCP:  Dr. Shannon Wadsworth          Rheum:  Dr. Drew Wood        on tapering prednison, down to 2.5 mg                                           Oral Surgeon:  Dr. Ackerman   Dental implants 1 going.             Eyes:  Dr.James Lao in [place] for cataracts   CC:  Osteoporosis    7/15/21  T scores:  LS -3.2;  FN -2.4           12/2/2021 X-ray spine:  no compressions; mild exaggeration of upper          kyphosis            (RA)  & fatigue          (cataract  66 yo with RA, visits for osteoporosis -  In July 2021, spine T -3.2;  started on Tymlos 11/2021  but developed hypercalcemia and Tymlos discontinued Feb 2022.   Now on Evenity.- will receive #11 next week.      For RA, now on 2 mg prednisone. End of Jan 2023  had Evenity #7    vit D 40   2/24/23  will be Evenity * 8,  March 9 and June 12 so July switch to Prolia    Impression:  Tolerating the Evenity without problem.  Spine T score trajectory is of improvement.  She will be going for a follow up bone density by July.  She should also be starting Prolia every 7 months by July (after Evenity #12) Plan:  She will discuss dental issues with her oral surgeon.      Feb 16, 2023      iPhone      PCP:  Dr. Shannon Wadsworth          Rheum:  Dr. Drew Wood        on tapering prednison, down to 2.5 mg                                           Oral Surgeon:  Dr. Ackerman   Dental implants 1 going.             Eyes:  Dr.James Lao in [place] for cataracts   CC:  Osteoporosis    7/15/21  T scores:  LS -3.2;  FN -2.4           12/2/2021 X-ray spine:  no compressions; mild exaggeration of upper          kyphosis            (RA)  & fatigue          (cataract  66 yo with RA, visits for osteoporosis -  In July 2021, spine T -3.2;  started on Tymlos 11/2021  but developed hypercalcemia and Tymlos discontinued Feb 2022.   Now on Evenity.  For RA, now on 2 mg prednisone. End of Jan 2023  had Evenity #7    vit D 40   2/24/23  will be Evenity * 8,  March 9 and June 12 so July switch to Prolia    Impression:  Tolerating the Evenity without problem.  Spine T score trajectory is of improvement.    Plan:  Will aim to transition to Prolia by July.     Nov 11, 2022    iPhone  PCP:  Dr. Shannon Wadsworth          Rheum:  Dr. Drew Wood        on tapering prednison, down to 2.5 mg                                           Oral Surgeon:  Dr. Ackerman   Dental implants 1 going.             Eyes:  Dr.James Lao in [place] for cataracts   CC:  Osteoporosis    7/15/21  T scores:  LS -3.2;  FN -2.4       12/2/2021 X-ray spine:  no compressions; mild exaggeration of upper          kyphosis            (RA)  & fatigue          (cataract  Recently went for Evenity #4    No problem               last to be August 2023     Could not see ortho because of pelvic pain.      Imp:  Doing well on Evenity.   Will be going for cataract surgery    She will let her oral surgeon know that after 12 months of Evenity, she will transition to Prolia     Jul 22, 2022        Telephonic  PCP:  Dr. Shannon Wadsworth          Rheum:  Dr. Drew Wood                                               Oral Surgeon:  Dr. Ackerman   Dental implants 1 going on 2.   CC:  Osteoporosis    7/15/21  T scores:  LS -3.2;  FN -2.4       12/2/2021 X-ray spine:  no compressions; mild exaggeration of upper          kyphosis            (RA)    Because of spine T -3.2,and because of dental procedures:  two implants -  given Rx for Tymlos but calcium went to 10.7 and osteocalcin went from 10 to 209 so Tymlos stopped.   f/u BD reviewed by me today and she wishes not to hear result.   Transitioning to Evenity and she has been told of PA requirement.  Imp:  Severe osteoporosis of spine with kyphosis. Did not tolerate Tymlos.  Plan:  Evenity requiested.      Mar 14, 2022         Dr. Hussein Ackerman  p    fax   PCP:  Dr. Shannon Wadsworth          Rheum:  Dr. Drew Wood                                               Oral Surgeon:  Dr. Ackerman   Dental implants 1 going on 2.   CC:  Osteoporosis    7/15/21  T scores:  LS -3.2;  FN -2.4       12/2/2021 X-ray spine:  no compressions; mild exaggeration of upper          kyphosis            (RA)    67 yo with spine T -3.2, low bone turnover markers.   Long history of dental issues. First implant 15 years ago  - only two implants "took" and two didn't take. Most recent implant finished up last week (takes 9 months) - She will aim for crown in August - 6 months after completed.    It is like a molar on the right. She has been told by oral surgeon that she is not going to to need further work on teeth in the near future. Started on Tymlos; however, developed elevated calcium with dramatic increase in markers of bone turnover and Tymlos discontinued.   Those chemical changes   Impression:  Osteoporosis of spine;  dental issues winding down.  Plan:  Will inquire of insurnce plan if Evenity can be covered.  -     Feb 26, 2022  PCP:  Dr. Shannon Wadsworth          Rheum:  Dr. Drew Wood                                               Oral Surgeon:  Dr. Ackerman   Dental implants 1 going on 2.   CC:  Osteoporosis    7/15/21  T scores:  LS -3.2;  FN -2.4       12/2/2021 X-ray spine:  no compressions; mild exaggeration of upper          kyphosis            (RA)    Dropped off urine today Because of hypercalcemia, decreased intake to 600 mg/dl Off of the Tymlos  after elevation of calcium, alk phos, BS BS, calcium, alk phos, all returned to normal.  For RA, she has been treated with Actemra, chloroquine, prednisone.     Had shingles.  She has dental iimplant work in progress.  Imp:  Appears to have had marked boneover response based on measurments of OC, CTXs.  Plan:  Stay of of PTH analogues.  She will discuss next steps with her oral surgeon and dentist such as the proposal for Evenity x 12 months followed by Prolia.      Feb 03, 2022     in person  PCP:  Dr. Shannon Wadsworth          Rheum:  Dr. Drew Wood                                               Oral Surgeon:  Dr. Ackerman   Dental implants 1 going on 2.   CC:  Osteoporosis    7/15/21  T scores:  LS -3.2;  FN -2.4       12/2/2021 X-ray spine:  no compressions; mild exaggeration of upper          kyphosis            (RA)    Now taking Tymlos since Nov 10     By Nov 27 alk phos went from 40 to 191, but by Jan 31 alk phos down to 129   Creatinine went up to 1.34 from 1.1 and calcium up to 10.7 from 10.4  She also notes constipation.  Impression:  Not clear if metabolic changes are a side effect of the Tymlos.     Plan:  Put Tymlos on "hold" Decrease calcium tabs by 1/2 to 600 mg/dl Updated labs today, call for results in a few days  and then f/u labs/visit  again in 4 weeks.     She will also see her PCP as well.     Nov 09, 2021    in person  PCP:  Dr. Shannon Wadsworth          Rheum:  Dr. Drew Wood                                               Oral Surgeon:  Dr. 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  Started on prednisone about 2 1/2 years  - about 20 mg/d and now 2.5 mg   Spine T -3.2.   Has not yet had a chance to go for X-ray spine, but has appointment for early December. Also in process of dental implant. CTXs.  154  Impression:  X-ray spine will be helpful. Appropriate candidate for anabolic  Plan: Instructed in use of Tymlos and she did well.  She will call me in a few days to let me how she is doing and she will go for updated labs before the end of the month.      Oct 22, 2021  PCP:  Dr. Shannon Wadsworth          Rheum:  Dr. Drew Wood                                               Oral Surgeon:  Dr. 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  Started on prednisone about 2 1/2 years  - about 20 mg/d and now 2.5 mg and is now able to turn her neck much better. Also receives weekly Actemra, Plaquinil,.     Started on Fosamax 3/2018  - and stopped about a year ago when she went for a dental implant (has Hx of multiple failed implants)                     Oral surgeon   [place]   - going for another implant in 2 weeks.     Recent bone density at [facility] on 7/15/21  Lumbar spine, L1 to L4, total: 0.695 gm./cm.2.  T-score: -3.2  Z-score: -1.5  Left hip, femoral neck: 0.583 gm./cm.2.  T-score: -2.4  Z-score: 0.9  Impression:  the recent spine T score of -3.2 indicates that the Fosamax treatment has worn off and she is in need of additional pharmacologic  intervention     She can not take an antiresorptive at this time because of the multiple previous failed dental implants and the need for another dental implant in the future.   Population studies indicate that the spine bone strength would be far better optimized by treating with an anabolic agent at this time, such as Tymlos, Forteo or generic teraparatide for 18-24 months and this would be acceptable to the oral surgeon as well.    Plan:  Updated lab tests.  In May the vitamin D level was sufficient at 48, thyroid function tests were within normal limits and serum calcium was within normal limits at 9.8.   A request to her pharmacy will be sent for Tymlos and a prior authoirzation will be completed if indicated. She will call me about 10 days after labs have been drawn.

## 2024-08-13 ENCOUNTER — AMBULATORY SURGERY CENTER (OUTPATIENT)
Dept: URBAN - METROPOLITAN AREA SURGERY 17 | Facility: SURGERY | Age: 69
Setting detail: OPHTHALMOLOGY
End: 2024-08-13
Payer: COMMERCIAL

## 2024-08-13 DIAGNOSIS — H02.831: ICD-10-CM

## 2024-08-13 PROCEDURE — 15822 BLEPHAROPLASTY UPPER EYELID: CPT | Mod: LT | Performed by: OPHTHALMOLOGY

## 2024-08-13 PROCEDURE — 55555 MISCELLANEOUS CHARGES: CPT | Performed by: OPHTHALMOLOGY

## 2024-08-20 ENCOUNTER — OFFICE (OUTPATIENT)
Dept: URBAN - METROPOLITAN AREA CLINIC 29 | Facility: CLINIC | Age: 69
Setting detail: OPHTHALMOLOGY
End: 2024-08-20
Payer: COMMERCIAL

## 2024-08-20 PROCEDURE — 99024 POSTOP FOLLOW-UP VISIT: CPT | Performed by: OPHTHALMOLOGY

## 2024-08-20 ASSESSMENT — CONFRONTATIONAL VISUAL FIELD TEST (CVF)
OD_FINDINGS: FULL
OS_FINDINGS: FULL

## 2024-08-20 ASSESSMENT — LID POSITION - PTOSIS
OD_PTOSIS: RUL 1+
OS_PTOSIS: LUL 1+

## 2024-08-20 ASSESSMENT — LID POSITION - DERMATOCHALASIS: OD_DERMATOCHALASIS: RUL T

## 2024-10-28 ENCOUNTER — APPOINTMENT (OUTPATIENT)
Dept: ENDOCRINOLOGY | Facility: CLINIC | Age: 69
End: 2024-10-28
Payer: MEDICARE

## 2024-10-28 DIAGNOSIS — M81.8 OTHER OSTEOPOROSIS W/OUT CURRENT PATHOLOGICAL FRACTURE: ICD-10-CM

## 2024-10-28 PROCEDURE — 99215 OFFICE O/P EST HI 40 MIN: CPT

## 2024-11-13 DIAGNOSIS — M81.8 OTHER OSTEOPOROSIS W/OUT CURRENT PATHOLOGICAL FRACTURE: ICD-10-CM

## 2024-11-13 RX ORDER — DENOSUMAB 60 MG/ML
60 INJECTION SUBCUTANEOUS
Qty: 1 | Refills: 1 | Status: ACTIVE | COMMUNITY
Start: 2024-11-13

## 2024-11-18 ENCOUNTER — RX RENEWAL (OUTPATIENT)
Age: 69
End: 2024-11-18

## 2025-01-14 ENCOUNTER — RESULT REVIEW (OUTPATIENT)
Age: 70
End: 2025-01-14

## 2025-01-15 ENCOUNTER — TRANSCRIPTION ENCOUNTER (OUTPATIENT)
Age: 70
End: 2025-01-15

## 2025-01-21 ENCOUNTER — TRANSCRIPTION ENCOUNTER (OUTPATIENT)
Age: 70
End: 2025-01-21

## 2025-01-23 ENCOUNTER — APPOINTMENT (OUTPATIENT)
Dept: RHEUMATOLOGY | Facility: CLINIC | Age: 70
End: 2025-01-23

## 2025-01-24 ENCOUNTER — NON-APPOINTMENT (OUTPATIENT)
Age: 70
End: 2025-01-24

## 2025-01-30 ENCOUNTER — APPOINTMENT (OUTPATIENT)
Dept: SURGERY | Facility: CLINIC | Age: 70
End: 2025-01-30

## 2025-01-30 VITALS
BODY MASS INDEX: 20.58 KG/M2 | HEART RATE: 92 BPM | HEIGHT: 61 IN | OXYGEN SATURATION: 97 % | WEIGHT: 109 LBS | DIASTOLIC BLOOD PRESSURE: 84 MMHG | SYSTOLIC BLOOD PRESSURE: 149 MMHG | TEMPERATURE: 97.6 F

## 2025-02-11 ENCOUNTER — RX RENEWAL (OUTPATIENT)
Age: 70
End: 2025-02-11

## 2025-02-12 ENCOUNTER — NON-APPOINTMENT (OUTPATIENT)
Age: 70
End: 2025-02-12

## 2025-02-13 ENCOUNTER — APPOINTMENT (OUTPATIENT)
Dept: GASTROENTEROLOGY | Facility: CLINIC | Age: 70
End: 2025-02-13
Payer: MEDICARE

## 2025-02-13 VITALS
WEIGHT: 109 LBS | BODY MASS INDEX: 20.58 KG/M2 | HEART RATE: 90 BPM | DIASTOLIC BLOOD PRESSURE: 70 MMHG | HEIGHT: 61 IN | SYSTOLIC BLOOD PRESSURE: 138 MMHG | OXYGEN SATURATION: 99 %

## 2025-02-13 DIAGNOSIS — Z93.3 COLOSTOMY STATUS: ICD-10-CM

## 2025-02-13 DIAGNOSIS — Z12.11 ENCOUNTER FOR SCREENING FOR MALIGNANT NEOPLASM OF COLON: ICD-10-CM

## 2025-02-13 PROCEDURE — 99203 OFFICE O/P NEW LOW 30 MIN: CPT

## 2025-02-24 ENCOUNTER — NON-APPOINTMENT (OUTPATIENT)
Age: 70
End: 2025-02-24

## 2025-02-24 DIAGNOSIS — Z93.3 COLOSTOMY STATUS: ICD-10-CM

## 2025-02-24 DIAGNOSIS — M17.10 UNILATERAL PRIMARY OSTEOARTHRITIS, UNSPECIFIED KNEE: ICD-10-CM

## 2025-02-24 DIAGNOSIS — Z87.09 PERSONAL HISTORY OF OTHER DISEASES OF THE RESPIRATORY SYSTEM: ICD-10-CM

## 2025-02-24 DIAGNOSIS — F51.02 ADJUSTMENT INSOMNIA: ICD-10-CM

## 2025-02-24 DIAGNOSIS — Z86.19 PERSONAL HISTORY OF OTHER INFECTIOUS AND PARASITIC DISEASES: ICD-10-CM

## 2025-02-24 DIAGNOSIS — D86.9 SARCOIDOSIS, UNSPECIFIED: ICD-10-CM

## 2025-02-24 DIAGNOSIS — Z12.11 ENCOUNTER FOR SCREENING FOR MALIGNANT NEOPLASM OF COLON: ICD-10-CM

## 2025-02-26 ENCOUNTER — RESULT REVIEW (OUTPATIENT)
Age: 70
End: 2025-02-26

## 2025-02-26 PROBLEM — M81.0 OSTEOPOROSIS: Status: ACTIVE | Noted: 2025-02-26

## 2025-02-27 ENCOUNTER — NON-APPOINTMENT (OUTPATIENT)
Age: 70
End: 2025-02-27

## 2025-02-27 ENCOUNTER — APPOINTMENT (OUTPATIENT)
Dept: SURGERY | Facility: CLINIC | Age: 70
End: 2025-02-27
Payer: MEDICARE

## 2025-02-27 ENCOUNTER — APPOINTMENT (OUTPATIENT)
Dept: CARDIOLOGY | Facility: CLINIC | Age: 70
End: 2025-02-27
Payer: MEDICARE

## 2025-02-27 VITALS
HEIGHT: 61 IN | SYSTOLIC BLOOD PRESSURE: 155 MMHG | OXYGEN SATURATION: 98 % | TEMPERATURE: 97.3 F | WEIGHT: 114 LBS | HEART RATE: 91 BPM | DIASTOLIC BLOOD PRESSURE: 83 MMHG | BODY MASS INDEX: 21.52 KG/M2

## 2025-02-27 VITALS
DIASTOLIC BLOOD PRESSURE: 70 MMHG | SYSTOLIC BLOOD PRESSURE: 130 MMHG | WEIGHT: 110 LBS | OXYGEN SATURATION: 97 % | HEART RATE: 72 BPM | BODY MASS INDEX: 20.78 KG/M2

## 2025-02-27 DIAGNOSIS — R00.2 PALPITATIONS: ICD-10-CM

## 2025-02-27 DIAGNOSIS — M06.09 RHEUMATOID ARTHRITIS W/OUT RHEUMATOID FACTOR, MULTIPLE SITES: ICD-10-CM

## 2025-02-27 DIAGNOSIS — Z87.898 PERSONAL HISTORY OF OTHER SPECIFIED CONDITIONS: ICD-10-CM

## 2025-02-27 DIAGNOSIS — R94.31 ABNORMAL ELECTROCARDIOGRAM [ECG] [EKG]: ICD-10-CM

## 2025-02-27 DIAGNOSIS — E78.00 PURE HYPERCHOLESTEROLEMIA, UNSPECIFIED: ICD-10-CM

## 2025-02-27 DIAGNOSIS — Z82.49 FAMILY HISTORY OF ISCHEMIC HEART DISEASE AND OTHER DISEASES OF THE CIRCULATORY SYSTEM: ICD-10-CM

## 2025-02-27 DIAGNOSIS — I10 ESSENTIAL (PRIMARY) HYPERTENSION: ICD-10-CM

## 2025-02-27 PROCEDURE — 93000 ELECTROCARDIOGRAM COMPLETE: CPT

## 2025-02-27 PROCEDURE — 99215 OFFICE O/P EST HI 40 MIN: CPT

## 2025-02-27 PROCEDURE — G0537: CPT

## 2025-02-27 PROCEDURE — 99024 POSTOP FOLLOW-UP VISIT: CPT

## 2025-03-05 ENCOUNTER — APPOINTMENT (OUTPATIENT)
Dept: ENDOCRINOLOGY | Facility: CLINIC | Age: 70
End: 2025-03-05
Payer: MEDICARE

## 2025-03-05 VITALS
HEIGHT: 61 IN | OXYGEN SATURATION: 99 % | DIASTOLIC BLOOD PRESSURE: 70 MMHG | HEART RATE: 88 BPM | WEIGHT: 114 LBS | BODY MASS INDEX: 21.52 KG/M2 | SYSTOLIC BLOOD PRESSURE: 118 MMHG

## 2025-03-05 DIAGNOSIS — M81.0 AGE-RELATED OSTEOPOROSIS W/OUT CURRENT PATHOLOGICAL FRACTURE: ICD-10-CM

## 2025-03-05 PROCEDURE — 99214 OFFICE O/P EST MOD 30 MIN: CPT

## 2025-03-21 ENCOUNTER — APPOINTMENT (OUTPATIENT)
Dept: ENDOCRINOLOGY | Facility: CLINIC | Age: 70
End: 2025-03-21

## 2025-03-21 ENCOUNTER — TRANSCRIPTION ENCOUNTER (OUTPATIENT)
Age: 70
End: 2025-03-21

## 2025-03-21 ENCOUNTER — APPOINTMENT (OUTPATIENT)
Dept: CARDIOLOGY | Facility: CLINIC | Age: 70
End: 2025-03-21
Payer: MEDICARE

## 2025-03-21 PROCEDURE — 93306 TTE W/DOPPLER COMPLETE: CPT

## 2025-03-25 ENCOUNTER — APPOINTMENT (OUTPATIENT)
Dept: GASTROENTEROLOGY | Facility: HOSPITAL | Age: 70
End: 2025-03-25

## 2025-03-27 ENCOUNTER — APPOINTMENT (OUTPATIENT)
Dept: SURGERY | Facility: CLINIC | Age: 70
End: 2025-03-27

## 2025-03-27 ENCOUNTER — APPOINTMENT (OUTPATIENT)
Dept: PLASTIC SURGERY | Facility: CLINIC | Age: 70
End: 2025-03-27

## 2025-03-27 VITALS
TEMPERATURE: 98 F | HEIGHT: 61 IN | WEIGHT: 112 LBS | OXYGEN SATURATION: 98 % | HEART RATE: 89 BPM | BODY MASS INDEX: 21.14 KG/M2 | DIASTOLIC BLOOD PRESSURE: 89 MMHG | SYSTOLIC BLOOD PRESSURE: 155 MMHG

## 2025-03-27 PROCEDURE — 99024 POSTOP FOLLOW-UP VISIT: CPT

## 2025-03-28 ENCOUNTER — NON-APPOINTMENT (OUTPATIENT)
Age: 70
End: 2025-03-28

## 2025-03-31 ENCOUNTER — APPOINTMENT (OUTPATIENT)
Dept: SURGERY | Facility: CLINIC | Age: 70
End: 2025-03-31

## 2025-04-17 PROBLEM — Z93.3 STATUS POST HARTMANN PROCEDURE: Status: ACTIVE | Noted: 2025-04-17

## 2025-04-18 ENCOUNTER — TRANSCRIPTION ENCOUNTER (OUTPATIENT)
Age: 70
End: 2025-04-18

## 2025-04-18 RX ORDER — ALENDRONATE SODIUM 70 MG/1
70 TABLET ORAL
Qty: 4 | Refills: 5 | Status: ACTIVE | COMMUNITY
Start: 2025-04-18 | End: 1900-01-01

## 2025-04-24 ENCOUNTER — APPOINTMENT (OUTPATIENT)
Dept: SURGERY | Facility: CLINIC | Age: 70
End: 2025-04-24
Payer: MEDICARE

## 2025-04-24 VITALS
SYSTOLIC BLOOD PRESSURE: 157 MMHG | BODY MASS INDEX: 22.47 KG/M2 | HEART RATE: 80 BPM | DIASTOLIC BLOOD PRESSURE: 88 MMHG | OXYGEN SATURATION: 98 % | HEIGHT: 61 IN | WEIGHT: 119 LBS | TEMPERATURE: 98.3 F

## 2025-04-24 DIAGNOSIS — Z93.3 COLOSTOMY STATUS: ICD-10-CM

## 2025-04-24 DIAGNOSIS — M06.09 RHEUMATOID ARTHRITIS W/OUT RHEUMATOID FACTOR, MULTIPLE SITES: ICD-10-CM

## 2025-04-24 PROCEDURE — 99214 OFFICE O/P EST MOD 30 MIN: CPT

## 2025-05-05 ENCOUNTER — RX RENEWAL (OUTPATIENT)
Age: 70
End: 2025-05-05

## 2025-05-09 ENCOUNTER — APPOINTMENT (OUTPATIENT)
Dept: SURGERY | Facility: HOSPITAL | Age: 70
End: 2025-05-09

## 2025-05-13 DIAGNOSIS — Z93.3 COLOSTOMY STATUS: ICD-10-CM

## 2025-05-13 RX ORDER — NEOMYCIN SULFATE 500 MG/1
500 TABLET ORAL
Qty: 6 | Refills: 0 | Status: ACTIVE | COMMUNITY
Start: 2025-05-13 | End: 1900-01-01

## 2025-05-13 RX ORDER — METRONIDAZOLE 500 MG/1
500 TABLET ORAL 3 TIMES DAILY
Qty: 3 | Refills: 0 | Status: ACTIVE | COMMUNITY
Start: 2025-05-13 | End: 1900-01-01

## 2025-05-14 ENCOUNTER — RESULT REVIEW (OUTPATIENT)
Age: 70
End: 2025-05-14

## 2025-05-20 ENCOUNTER — APPOINTMENT (OUTPATIENT)
Dept: SURGERY | Facility: HOSPITAL | Age: 70
End: 2025-05-20

## 2025-05-20 ENCOUNTER — RESULT REVIEW (OUTPATIENT)
Age: 70
End: 2025-05-20

## 2025-05-23 ENCOUNTER — TRANSCRIPTION ENCOUNTER (OUTPATIENT)
Age: 70
End: 2025-05-23

## 2025-05-28 ENCOUNTER — TRANSCRIPTION ENCOUNTER (OUTPATIENT)
Age: 70
End: 2025-05-28

## 2025-05-29 ENCOUNTER — APPOINTMENT (OUTPATIENT)
Dept: SURGERY | Facility: CLINIC | Age: 70
End: 2025-05-29

## 2025-05-29 VITALS
HEART RATE: 82 BPM | SYSTOLIC BLOOD PRESSURE: 150 MMHG | BODY MASS INDEX: 21.52 KG/M2 | WEIGHT: 114 LBS | DIASTOLIC BLOOD PRESSURE: 89 MMHG | OXYGEN SATURATION: 98 % | HEIGHT: 61 IN | TEMPERATURE: 98.4 F

## 2025-05-29 PROCEDURE — 99024 POSTOP FOLLOW-UP VISIT: CPT

## 2025-06-05 ENCOUNTER — APPOINTMENT (OUTPATIENT)
Dept: RHEUMATOLOGY | Facility: CLINIC | Age: 70
End: 2025-06-05
Payer: MEDICARE

## 2025-06-05 VITALS
SYSTOLIC BLOOD PRESSURE: 110 MMHG | DIASTOLIC BLOOD PRESSURE: 70 MMHG | HEIGHT: 64 IN | OXYGEN SATURATION: 97 % | HEART RATE: 84 BPM | BODY MASS INDEX: 19.63 KG/M2 | WEIGHT: 115 LBS

## 2025-06-05 DIAGNOSIS — M06.09 RHEUMATOID ARTHRITIS W/OUT RHEUMATOID FACTOR, MULTIPLE SITES: ICD-10-CM

## 2025-06-05 PROCEDURE — G2211 COMPLEX E/M VISIT ADD ON: CPT

## 2025-06-05 PROCEDURE — 99214 OFFICE O/P EST MOD 30 MIN: CPT

## 2025-06-12 ENCOUNTER — APPOINTMENT (OUTPATIENT)
Dept: PLASTIC SURGERY | Facility: CLINIC | Age: 70
End: 2025-06-12
Payer: SELF-PAY

## 2025-06-12 PROBLEM — Z41.1 ENCOUNTER FOR COSMETIC PROCEDURE: Status: ACTIVE | Noted: 2025-06-12

## 2025-06-12 PROCEDURE — 64612 DESTROY NERVE FACE MUSCLE: CPT

## 2025-06-13 ENCOUNTER — APPOINTMENT (OUTPATIENT)
Dept: PLASTIC SURGERY | Facility: CLINIC | Age: 70
End: 2025-06-13

## 2025-06-17 ENCOUNTER — NON-APPOINTMENT (OUTPATIENT)
Age: 70
End: 2025-06-17

## 2025-07-03 ENCOUNTER — APPOINTMENT (OUTPATIENT)
Dept: PLASTIC SURGERY | Facility: CLINIC | Age: 70
End: 2025-07-03

## 2025-07-03 ENCOUNTER — APPOINTMENT (OUTPATIENT)
Dept: SURGERY | Facility: CLINIC | Age: 70
End: 2025-07-03

## 2025-07-03 VITALS
WEIGHT: 119.4 LBS | BODY MASS INDEX: 20.5 KG/M2 | DIASTOLIC BLOOD PRESSURE: 76 MMHG | HEART RATE: 83 BPM | OXYGEN SATURATION: 100 % | SYSTOLIC BLOOD PRESSURE: 134 MMHG | TEMPERATURE: 98.4 F

## 2025-07-03 PROCEDURE — MS002: CPT

## 2025-07-09 ENCOUNTER — APPOINTMENT (OUTPATIENT)
Dept: ENDOCRINOLOGY | Facility: CLINIC | Age: 70
End: 2025-07-09
Payer: MEDICARE

## 2025-07-09 PROCEDURE — 99215 OFFICE O/P EST HI 40 MIN: CPT | Mod: 2W

## 2025-08-13 ENCOUNTER — APPOINTMENT (OUTPATIENT)
Dept: RHEUMATOLOGY | Facility: CLINIC | Age: 70
End: 2025-08-13
Payer: MEDICARE

## 2025-08-13 VITALS — HEART RATE: 84 BPM | OXYGEN SATURATION: 100 %

## 2025-08-13 DIAGNOSIS — M06.09 RHEUMATOID ARTHRITIS W/OUT RHEUMATOID FACTOR, MULTIPLE SITES: ICD-10-CM

## 2025-08-13 PROCEDURE — 20605 DRAIN/INJ JOINT/BURSA W/O US: CPT | Mod: LT

## 2025-08-13 PROCEDURE — 99214 OFFICE O/P EST MOD 30 MIN: CPT | Mod: 25

## 2025-08-13 RX ORDER — PREDNISONE 1 MG/1
1 TABLET ORAL DAILY
Qty: 180 | Refills: 1 | Status: ACTIVE | COMMUNITY

## 2025-08-13 RX ORDER — TRIAMCINOLONE ACETONIDE 40 MG/ML
40 SUSPENSION INTRA-ARTERIAL; INTRAMUSCULAR
Qty: 1 | Refills: 0 | Status: COMPLETED | OUTPATIENT
Start: 2025-08-13 | End: 2025-08-20

## 2025-08-13 RX ORDER — TRIAMCINOLONE ACETONIDE 40 MG/ML
40 SUSPENSION INTRA-ARTERIAL; INTRAMUSCULAR
Qty: 1 | Refills: 0 | Status: COMPLETED
Start: 2025-08-13 | End: 2025-08-20

## 2025-08-13 RX ADMIN — TRIAMCINOLONE ACETONIDE 0 MG/ML: 40 INJECTION, SUSPENSION INTRA-ARTICULAR; INTRAMUSCULAR at 00:00

## 2025-08-28 ENCOUNTER — RESULT REVIEW (OUTPATIENT)
Age: 70
End: 2025-08-28

## 2025-09-11 ENCOUNTER — APPOINTMENT (OUTPATIENT)
Dept: PLASTIC SURGERY | Facility: CLINIC | Age: 70
End: 2025-09-11
Payer: MEDICARE

## 2025-09-11 ENCOUNTER — APPOINTMENT (OUTPATIENT)
Dept: RHEUMATOLOGY | Facility: CLINIC | Age: 70
End: 2025-09-11

## 2025-09-11 VITALS
OXYGEN SATURATION: 98 % | DIASTOLIC BLOOD PRESSURE: 89 MMHG | SYSTOLIC BLOOD PRESSURE: 152 MMHG | TEMPERATURE: 98 F | HEART RATE: 82 BPM | RESPIRATION RATE: 18 BRPM

## 2025-09-11 DIAGNOSIS — L90.5 SCAR CONDITIONS AND FIBROSIS OF SKIN: ICD-10-CM

## 2025-09-11 DIAGNOSIS — Z41.1 ENCOUNTER FOR COSMETIC SURGERY: ICD-10-CM

## 2025-09-11 PROCEDURE — 99202 OFFICE O/P NEW SF 15 MIN: CPT
